# Patient Record
Sex: FEMALE | Race: WHITE | NOT HISPANIC OR LATINO | URBAN - METROPOLITAN AREA
[De-identification: names, ages, dates, MRNs, and addresses within clinical notes are randomized per-mention and may not be internally consistent; named-entity substitution may affect disease eponyms.]

---

## 2019-04-30 ENCOUNTER — APPOINTMENT (RX ONLY)
Dept: URBAN - METROPOLITAN AREA CLINIC 45 | Facility: CLINIC | Age: 84
Setting detail: DERMATOLOGY
End: 2019-04-30

## 2019-04-30 PROBLEM — C44.629 SQUAMOUS CELL CARCINOMA OF SKIN OF LEFT UPPER LIMB, INCLUDING SHOULDER: Status: ACTIVE | Noted: 2019-04-30

## 2019-04-30 PROCEDURE — ? MOHS SURGERY

## 2019-04-30 PROCEDURE — 13131 CMPLX RPR F/C/C/M/N/AX/G/H/F: CPT

## 2019-04-30 PROCEDURE — 17311 MOHS 1 STAGE H/N/HF/G: CPT

## 2019-04-30 NOTE — PROCEDURE: MOHS SURGERY
Purse String (Simple) Text: We discussed various closure modalities with the patient, including healing by second intention, primary closure, skin graft and various flaps, and felt that the location and configuration of the defect indicated that a purse-string closure would result in the least disturbance of the position and function of the surrounding anatomic structures and provide the best result.  The technique, its benefits, alternatives and risks were discussed with the patient.  Appropriate instructions were given, informed consent was obtained, and then the patient underwent the procedure as follows: The patient was taken to the operative suite and placed supine on the operating room table.  The area of the defect and the surrounding skin was anesthetized with buffered 0.5% lidocaine with epinephrine.  The area was washed with chlorhexidine.  Sterile drapes were applied. \\n\\nThe wound edges of the Mohs surgery defect were debeveled. \\n Undermining was performed circumferentially around the surgical defect.  Closure of the subcutaneous was achieved with a deep buried purse string suture, which was then placed and tightened in the subcutaneous tissue.  A superficial layer of horizontal mattress suture was then placed through the epidermis in a purse-string fashion.

## 2019-04-30 NOTE — PROCEDURE: MOHS SURGERY
Rudi Garcia Keystone Flap Text: We discussed various closure modalities with the patient, including healing by second intention, primary closure, skin graft and various flaps.  The location and configuration of the defect indicated that a Keystone flap would result in the least disturbance of the position and function of the surrounding anatomic structures, and provide the best result.  The technique, its benefits, alternatives and risks were discussed with the patient.  The patient underwent the procedure as follows: The patient was positioned supine on the operating room table.  The area of the defect and the surrounding skin were anesthetized with buffered 1% lidocaine with epinephrine.  The area was washed with chlorhexidine.  Sterile drapes were applied. \\n\\nThe defect edges were debeveled with a #15 scalpel blade.  Given the location of the defect, shape of the defect a keystone flap was deemed most appropriate.  Using a sterile surgical marker, an appropriate keystone flap was drawn incorporating the defect, outlining the appropriate donor tissue and placing the expected incisions within the relaxed skin tension lines where possible. The area thus outlined was incised deep to adipose tissue with a #15 scalpel blade.  The skin margins were undermined to an appropriate distance in all directions around the primary defect and laterally outward around the flap utilizing iris scissors.

## 2019-04-30 NOTE — PROCEDURE: MOHS SURGERY
Stage 8: Additional Anesthesia Type: 1% lidocaine with epinephrine and a 1:10 solution of 8.4% sodium bicarbonate

## 2019-04-30 NOTE — PROCEDURE: MOHS SURGERY
Bilobed Flap Text: We discussed various closure modalities with the patient, including healing by second intention, primary closure, skin graft and various flaps.  The location and configuration of the defect indicated that a bilobed transposition flap would result in the least disturbance of the position and function of the surrounding anatomic structures, and provide the best result.  The technique, its benefits, alternatives and risks were discussed with the patient.  The patient underwent the procedure as follows: The patient was positioned supine on the operating room table.  The area of the defect and the surrounding skin were anesthetized with buffered 1% lidocaine with epinephrine.  The area was washed with chlorhexidine.  Sterile drapes were applied. \\n\\nThe wound edges were debeveled and extensively undermined.  A bilobed transposition flap was designed, incised, and elevated.  Hemostasis was achieved with spot electrodesiccation.  The tertiary defect that was created in design of the flap was closed using buried interrupted sutures.  The flap was then transposed to cover the primary defect, trimmed to more accurately fit the defect, and secured in position.  The standing cone so created was removed to fat by triangulation.  The 2nd lobe of the flap was trimmed to fit the secondary defect created in the flap design.  Additional buried interrupted sutures were used to achieve dermal wound apposition and secure the flap in place.  Final cutaneous approximation was achieved with running epidermal sutures.  The closure was manually tested and found to be sound for strength and hemostasis.

## 2019-05-14 ENCOUNTER — APPOINTMENT (RX ONLY)
Dept: URBAN - METROPOLITAN AREA CLINIC 45 | Facility: CLINIC | Age: 84
Setting detail: DERMATOLOGY
End: 2019-05-14

## 2019-05-14 DIAGNOSIS — L90.5 SCAR CONDITIONS AND FIBROSIS OF SKIN: ICD-10-CM

## 2019-05-14 DIAGNOSIS — Z48.817 ENCOUNTER FOR SURGICAL AFTERCARE FOLLOWING SURGERY ON THE SKIN AND SUBCUTANEOUS TISSUE: ICD-10-CM

## 2019-05-14 PROBLEM — J30.1 ALLERGIC RHINITIS DUE TO POLLEN: Status: ACTIVE | Noted: 2019-05-14

## 2019-05-14 PROBLEM — Z85.820 PERSONAL HISTORY OF MALIGNANT MELANOMA OF SKIN: Status: ACTIVE | Noted: 2019-05-14

## 2019-05-14 PROCEDURE — ? POST-OP WOUND CHECK

## 2019-05-14 PROCEDURE — 99024 POSTOP FOLLOW-UP VISIT: CPT

## 2019-05-14 ASSESSMENT — LOCATION SIMPLE DESCRIPTION DERM: LOCATION SIMPLE: LEFT HAND

## 2019-05-14 ASSESSMENT — LOCATION ZONE DERM: LOCATION ZONE: HAND

## 2019-05-14 ASSESSMENT — LOCATION DETAILED DESCRIPTION DERM: LOCATION DETAILED: LEFT RADIAL DORSAL HAND

## 2019-05-14 NOTE — PROCEDURE: POST-OP WOUND CHECK
Body Location Override (Optional - Billing Will Still Be Based On Selected Body Map Location If Applicable): left dorsal hand
Add 85376 Cpt? (Important Note: In 2017 The Use Of 75088 Is Being Tracked By Cms To Determine Future Global Period Reimbursement For Global Periods): yes
Detail Level: Simple

## 2019-10-02 ENCOUNTER — APPOINTMENT (RX ONLY)
Dept: URBAN - METROPOLITAN AREA CLINIC 44 | Facility: CLINIC | Age: 84
Setting detail: DERMATOLOGY
End: 2019-10-02

## 2019-10-02 DIAGNOSIS — D22 MELANOCYTIC NEVI: ICD-10-CM

## 2019-10-02 DIAGNOSIS — L82.1 OTHER SEBORRHEIC KERATOSIS: ICD-10-CM

## 2019-10-02 DIAGNOSIS — Z85.828 PERSONAL HISTORY OF OTHER MALIGNANT NEOPLASM OF SKIN: ICD-10-CM

## 2019-10-02 DIAGNOSIS — Z87.2 PERSONAL HISTORY OF DISEASES OF THE SKIN AND SUBCUTANEOUS TISSUE: ICD-10-CM

## 2019-10-02 DIAGNOSIS — D18.0 HEMANGIOMA: ICD-10-CM

## 2019-10-02 DIAGNOSIS — L81.4 OTHER MELANIN HYPERPIGMENTATION: ICD-10-CM

## 2019-10-02 DIAGNOSIS — L57.0 ACTINIC KERATOSIS: ICD-10-CM

## 2019-10-02 PROBLEM — D48.5 NEOPLASM OF UNCERTAIN BEHAVIOR OF SKIN: Status: ACTIVE | Noted: 2019-10-02

## 2019-10-02 PROBLEM — D18.01 HEMANGIOMA OF SKIN AND SUBCUTANEOUS TISSUE: Status: ACTIVE | Noted: 2019-10-02

## 2019-10-02 PROBLEM — D22.5 MELANOCYTIC NEVI OF TRUNK: Status: ACTIVE | Noted: 2019-10-02

## 2019-10-02 PROCEDURE — ? COUNSELING

## 2019-10-02 PROCEDURE — ? INVENTORY

## 2019-10-02 PROCEDURE — ? SUNSCREEN RECOMMENDATIONS

## 2019-10-02 PROCEDURE — ? FULL BODY SKIN EXAM

## 2019-10-02 PROCEDURE — 99213 OFFICE O/P EST LOW 20 MIN: CPT | Mod: 25

## 2019-10-02 PROCEDURE — ? MEDICARE ABN

## 2019-10-02 PROCEDURE — 17000 DESTRUCT PREMALG LESION: CPT | Mod: 59

## 2019-10-02 PROCEDURE — 11102 TANGNTL BX SKIN SINGLE LES: CPT

## 2019-10-02 PROCEDURE — ? LIQUID NITROGEN

## 2019-10-02 PROCEDURE — ? BIOPSY BY SHAVE METHOD

## 2019-10-02 PROCEDURE — 17003 DESTRUCT PREMALG LES 2-14: CPT

## 2019-10-02 ASSESSMENT — LOCATION DETAILED DESCRIPTION DERM
LOCATION DETAILED: PERIUMBILICAL SKIN
LOCATION DETAILED: LEFT PROXIMAL DORSAL FOREARM
LOCATION DETAILED: RIGHT SUPERIOR UPPER BACK
LOCATION DETAILED: RIGHT POSTERIOR SHOULDER
LOCATION DETAILED: LEFT LATERAL ABDOMEN
LOCATION DETAILED: RIGHT SUPERIOR FOREHEAD
LOCATION DETAILED: LEFT CLAVICULAR SKIN
LOCATION DETAILED: RIGHT RIB CAGE
LOCATION DETAILED: LEFT DISTAL DORSAL FOREARM
LOCATION DETAILED: LEFT RIB CAGE
LOCATION DETAILED: RIGHT LATERAL ABDOMEN
LOCATION DETAILED: NASAL SUPRATIP
LOCATION DETAILED: SUPERIOR THORACIC SPINE

## 2019-10-02 ASSESSMENT — LOCATION SIMPLE DESCRIPTION DERM
LOCATION SIMPLE: LEFT FOREARM
LOCATION SIMPLE: LEFT CLAVICULAR SKIN
LOCATION SIMPLE: UPPER BACK
LOCATION SIMPLE: RIGHT FOREHEAD
LOCATION SIMPLE: RIGHT UPPER BACK
LOCATION SIMPLE: ABDOMEN
LOCATION SIMPLE: RIGHT SHOULDER
LOCATION SIMPLE: NOSE

## 2019-10-02 ASSESSMENT — LOCATION ZONE DERM
LOCATION ZONE: ARM
LOCATION ZONE: TRUNK
LOCATION ZONE: NOSE
LOCATION ZONE: FACE

## 2019-10-02 NOTE — HPI: EVALUATION OF SKIN LESION(S)
What Type Of Note Output Would You Prefer (Optional)?: Bullet Format
Hpi Title: Evaluation of Skin Lesions
How Severe Are Your Spot(S)?: mild
Have Your Spot(S) Been Treated In The Past?: has not been treated
Additional History: Last FSE 04/2019 NICK

## 2019-10-02 NOTE — PROCEDURE: BIOPSY BY SHAVE METHOD
Dressing: bandage
Render Path Notes In Note?: No
Was A Bandage Applied: Yes
Silver Nitrate Text: The wound bed was treated with silver nitrate after the biopsy was performed.
Hemostasis: Aluminum Chloride
Biopsy Method: Personna blade
Lab: 6
Billing Type: Third-Party Bill
Curettage Text: The wound bed was treated with curettage after the biopsy was performed.
Detail Level: Detailed
Post-Care Instructions: I reviewed with the patient in detail post-care instructions. Patient is to keep the biopsy site dry overnight, and then apply aquaphor or vaseline twice daily until healed.
Anesthesia Type: 2% lidocaine with epinephrine and a 1:10 solution of 8.4% sodium bicarbonate
Lab Facility: [id number]
Cryotherapy Text: The wound bed was treated with cryotherapy after the biopsy was performed.
Size Of Lesion In Cm: 0.8
Wound Care: Petrolatum
Additional Anesthesia Volume In Cc (Will Not Render If 0): 0
Notification Instructions: Patient will be notified of biopsy results. However, patient instructed to call the office if not contacted within 2 weeks.
Electrodesiccation Text: The wound bed was treated with electrodesiccation after the biopsy was performed.
Consent: Written consent was obtained and risks were reviewed including but not limited to scarring, infection, bleeding, scabbing, incomplete removal, nerve damage and allergy to anesthesia.
Depth Of Biopsy: dermis
Biopsy Type: H and E
Type Of Destruction Used: Curettage
Electrodesiccation And Curettage Text: The wound bed was treated with electrodesiccation and curettage after the biopsy was performed.
Anesthesia Volume In Cc (Will Not Render If 0): 1

## 2019-10-02 NOTE — PROCEDURE: MEDICARE ABN
Procedure (Limit To 20 Characters): AK
Reason?: Additional Information
Detail Level: Detailed
Payment Option: Option 1: Bill Medicare, await for decision on payment.
Reason?: Precancerous
Modifier Description: The following is a description of the modifiers listed below. GA - is added to claims with a properly executed Advanced Beneficiary Notice (ABN) in the file. GY - is added to claims in which the item or service is statutorily excluded, does not meet the definition of any Medicare benefit, or -for non-Medicare Insurers- is not a contract benefit. GZ - - Item or service expected to be denied as not reasonable and necessary.
Modifier Description: The following is a description of the modifiers listed below. GA - is added to claims with a properly executed Advanced Beneficiary Notice (ABN) in the file. GY - is added to claims in which the item or service is statutorily excluded, does not meet the definition of any Medicare benefit, or -for non-Medicare Insurers- is not a contract benefit. GZ - - Item or service expected to be denied as not reasonable and necessary.
Reason?: non-covered service
Procedure (Limit To 20 Characters): biopsy

## 2020-10-26 ENCOUNTER — APPOINTMENT (RX ONLY)
Dept: URBAN - METROPOLITAN AREA CLINIC 44 | Facility: CLINIC | Age: 85
Setting detail: DERMATOLOGY
End: 2020-10-26

## 2020-10-26 DIAGNOSIS — L81.4 OTHER MELANIN HYPERPIGMENTATION: ICD-10-CM

## 2020-10-26 DIAGNOSIS — Z87.2 PERSONAL HISTORY OF DISEASES OF THE SKIN AND SUBCUTANEOUS TISSUE: ICD-10-CM

## 2020-10-26 DIAGNOSIS — L57.0 ACTINIC KERATOSIS: ICD-10-CM

## 2020-10-26 DIAGNOSIS — Z85.828 PERSONAL HISTORY OF OTHER MALIGNANT NEOPLASM OF SKIN: ICD-10-CM

## 2020-10-26 DIAGNOSIS — L82.1 OTHER SEBORRHEIC KERATOSIS: ICD-10-CM

## 2020-10-26 DIAGNOSIS — B07.8 OTHER VIRAL WARTS: ICD-10-CM

## 2020-10-26 DIAGNOSIS — D22 MELANOCYTIC NEVI: ICD-10-CM

## 2020-10-26 DIAGNOSIS — D18.0 HEMANGIOMA: ICD-10-CM

## 2020-10-26 PROBLEM — D18.01 HEMANGIOMA OF SKIN AND SUBCUTANEOUS TISSUE: Status: ACTIVE | Noted: 2020-10-26

## 2020-10-26 PROBLEM — D22.5 MELANOCYTIC NEVI OF TRUNK: Status: ACTIVE | Noted: 2020-10-26

## 2020-10-26 PROCEDURE — 99214 OFFICE O/P EST MOD 30 MIN: CPT | Mod: 25

## 2020-10-26 PROCEDURE — ? LIQUID NITROGEN

## 2020-10-26 PROCEDURE — 17110 DESTRUCTION B9 LES UP TO 14: CPT

## 2020-10-26 PROCEDURE — ? COUNSELING

## 2020-10-26 PROCEDURE — 17003 DESTRUCT PREMALG LES 2-14: CPT | Mod: 59

## 2020-10-26 PROCEDURE — ? SUNSCREEN RECOMMENDATIONS

## 2020-10-26 PROCEDURE — ? FULL BODY SKIN EXAM

## 2020-10-26 PROCEDURE — 17000 DESTRUCT PREMALG LESION: CPT | Mod: 59

## 2020-10-26 PROCEDURE — ? MEDICARE ABN

## 2020-10-26 ASSESSMENT — LOCATION DETAILED DESCRIPTION DERM
LOCATION DETAILED: PERIUMBILICAL SKIN
LOCATION DETAILED: LEFT RIB CAGE
LOCATION DETAILED: 3RD WEB SPACE RIGHT HAND
LOCATION DETAILED: RIGHT RIB CAGE
LOCATION DETAILED: RIGHT SUPERIOR FOREHEAD
LOCATION DETAILED: RIGHT LATERAL ABDOMEN
LOCATION DETAILED: LEFT LATERAL ABDOMEN
LOCATION DETAILED: LEFT LATERAL FOREHEAD
LOCATION DETAILED: RIGHT RADIAL DORSAL HAND

## 2020-10-26 ASSESSMENT — LOCATION ZONE DERM
LOCATION ZONE: FACE
LOCATION ZONE: HAND
LOCATION ZONE: TRUNK

## 2020-10-26 ASSESSMENT — LOCATION SIMPLE DESCRIPTION DERM
LOCATION SIMPLE: RIGHT FOREHEAD
LOCATION SIMPLE: RIGHT HAND
LOCATION SIMPLE: ABDOMEN
LOCATION SIMPLE: LEFT FOREHEAD

## 2020-10-26 NOTE — PROCEDURE: MEDICARE ABN
Reason?: Additional Information
Payment Option: Option 2: Don't Bill Medicare, patient responsible for payment. Patient cannot appeal Medicare if billed.
Procedure (Limit To 20 Characters): brace
Detail Level: Detailed
Reason?: non-covered service

## 2020-10-26 NOTE — PROCEDURE: LIQUID NITROGEN
Render Note In Bullet Format When Appropriate: No
Number Of Freeze-Thaw Cycles: 2 freeze-thaw cycles
Detail Level: Zone
Duration Of Freeze Thaw-Cycle (Seconds): 0
Consent: The patient's consent was obtained including but not limited to risks of crusting, scabbing, blistering, scarring, darker or lighter pigmentary change, recurrence, incomplete removal and infection.
Post-Care Instructions: I reviewed with the patient in detail post-care instructions. Patient is to wear sunprotection, and avoid picking at any of the treated lesions. Pt may apply Vaseline to crusted or scabbing areas.
Medical Necessity Information: It is in your best interest to select a reason for this procedure from the list below. All of these items fulfill various CMS LCD requirements except the new and changing color options.
Medical Necessity Clause: This procedure was medically necessary because the lesions that were treated were: irritating to patient

## 2020-10-26 NOTE — HPI: EVALUATION OF SKIN LESION(S)
What Type Of Note Output Would You Prefer (Optional)?: Bullet Format
Hpi Title: Evaluation of Skin Lesions
How Severe Are Your Spot(S)?: mild
Have Your Spot(S) Been Treated In The Past?: has not been treated
Additional History: FSE with Jdl 10/2019

## 2021-04-28 ENCOUNTER — APPOINTMENT (RX ONLY)
Dept: URBAN - METROPOLITAN AREA CLINIC 44 | Facility: CLINIC | Age: 86
Setting detail: DERMATOLOGY
End: 2021-04-28

## 2021-04-28 DIAGNOSIS — D22 MELANOCYTIC NEVI: ICD-10-CM

## 2021-04-28 DIAGNOSIS — B07.8 OTHER VIRAL WARTS: ICD-10-CM

## 2021-04-28 DIAGNOSIS — L82.1 OTHER SEBORRHEIC KERATOSIS: ICD-10-CM

## 2021-04-28 DIAGNOSIS — R23.8 OTHER SKIN CHANGES: ICD-10-CM

## 2021-04-28 DIAGNOSIS — Z85.828 PERSONAL HISTORY OF OTHER MALIGNANT NEOPLASM OF SKIN: ICD-10-CM

## 2021-04-28 DIAGNOSIS — Z85.820 PERSONAL HISTORY OF MALIGNANT MELANOMA OF SKIN: ICD-10-CM

## 2021-04-28 DIAGNOSIS — L57.0 ACTINIC KERATOSIS: ICD-10-CM

## 2021-04-28 DIAGNOSIS — L81.4 OTHER MELANIN HYPERPIGMENTATION: ICD-10-CM

## 2021-04-28 DIAGNOSIS — D18.0 HEMANGIOMA: ICD-10-CM

## 2021-04-28 PROBLEM — D48.5 NEOPLASM OF UNCERTAIN BEHAVIOR OF SKIN: Status: ACTIVE | Noted: 2021-04-28

## 2021-04-28 PROBLEM — D22.5 MELANOCYTIC NEVI OF TRUNK: Status: ACTIVE | Noted: 2021-04-28

## 2021-04-28 PROBLEM — D18.01 HEMANGIOMA OF SKIN AND SUBCUTANEOUS TISSUE: Status: ACTIVE | Noted: 2021-04-28

## 2021-04-28 PROCEDURE — ? LIQUID NITROGEN

## 2021-04-28 PROCEDURE — 17003 DESTRUCT PREMALG LES 2-14: CPT | Mod: 59

## 2021-04-28 PROCEDURE — ? COUNSELING

## 2021-04-28 PROCEDURE — 17000 DESTRUCT PREMALG LESION: CPT | Mod: 59

## 2021-04-28 PROCEDURE — ? SUNSCREEN RECOMMENDATIONS

## 2021-04-28 PROCEDURE — 99213 OFFICE O/P EST LOW 20 MIN: CPT | Mod: 25

## 2021-04-28 PROCEDURE — 17110 DESTRUCTION B9 LES UP TO 14: CPT

## 2021-04-28 PROCEDURE — ? BIOPSY BY SHAVE METHOD

## 2021-04-28 PROCEDURE — ? FULL BODY SKIN EXAM

## 2021-04-28 PROCEDURE — ? MEDICARE ABN

## 2021-04-28 PROCEDURE — 11102 TANGNTL BX SKIN SINGLE LES: CPT | Mod: 59

## 2021-04-28 PROCEDURE — ? ADDITIONAL NOTES

## 2021-04-28 ASSESSMENT — LOCATION SIMPLE DESCRIPTION DERM
LOCATION SIMPLE: LEFT UPPER ARM
LOCATION SIMPLE: ABDOMEN
LOCATION SIMPLE: LEFT UPPER BACK
LOCATION SIMPLE: RIGHT MIDDLE FINGER
LOCATION SIMPLE: RIGHT PRETIBIAL REGION
LOCATION SIMPLE: LEFT LIP
LOCATION SIMPLE: LEFT PRETIBIAL REGION
LOCATION SIMPLE: LEFT ANTERIOR NECK
LOCATION SIMPLE: RIGHT SMALL FINGER
LOCATION SIMPLE: LEFT MIDDLE FINGER
LOCATION SIMPLE: POSTERIOR NECK
LOCATION SIMPLE: RIGHT HAND

## 2021-04-28 ASSESSMENT — LOCATION DETAILED DESCRIPTION DERM
LOCATION DETAILED: RIGHT PROXIMAL RADIAL PALMAR SMALL FINGER
LOCATION DETAILED: LEFT MEDIAL TRAPEZIAL NECK
LOCATION DETAILED: RIGHT DISTAL RADIAL PALMAR MIDDLE FINGER
LOCATION DETAILED: LEFT SUPERIOR LATERAL NECK
LOCATION DETAILED: RIGHT PROXIMAL PALMAR SMALL FINGER
LOCATION DETAILED: LEFT PROXIMAL PRETIBIAL REGION
LOCATION DETAILED: RIGHT ULNAR PALM
LOCATION DETAILED: LEFT MIDDLE PROXIMAL INTERPHALANGEAL JOINT
LOCATION DETAILED: RIGHT DISTAL PRETIBIAL REGION
LOCATION DETAILED: LEFT MID-UPPER BACK
LOCATION DETAILED: LEFT ANTERIOR PROXIMAL UPPER ARM
LOCATION DETAILED: RIGHT RIB CAGE
LOCATION DETAILED: RIGHT LATERAL TRAPEZIAL NECK
LOCATION DETAILED: RIGHT LATERAL ABDOMEN
LOCATION DETAILED: RIGHT PROXIMAL PRETIBIAL REGION
LOCATION DETAILED: LEFT LATERAL ABDOMEN
LOCATION DETAILED: LEFT DISTAL PRETIBIAL REGION
LOCATION DETAILED: LEFT INFERIOR VERMILION LIP

## 2021-04-28 ASSESSMENT — LOCATION ZONE DERM
LOCATION ZONE: FINGER
LOCATION ZONE: LEG
LOCATION ZONE: HAND
LOCATION ZONE: NECK
LOCATION ZONE: LIP
LOCATION ZONE: TRUNK
LOCATION ZONE: ARM

## 2021-04-28 NOTE — PROCEDURE: ADDITIONAL NOTES
Additional Notes: Patient consent was obtained to proceed with the visit and recommended plan of care after discussion of all risks and benefits, including the risks of COVID-19 exposure.
Detail Level: Simple
Additional Notes: Patient inquired about fillers for the lips and around the mouth. Quoted $700/syringe restylane L/emily mentioned. Written quote given
Render Risk Assessment In Note?: no

## 2021-04-28 NOTE — HPI: EVALUATION OF SKIN LESION(S)
What Type Of Note Output Would You Prefer (Optional)?: Bullet Format
Hpi Title: Evaluation of Skin Lesions
How Severe Are Your Spot(S)?: mild
Have Your Spot(S) Been Treated In The Past?: has not been treated
Additional History: Last seen October 2020 Dr. Nascimento \\nPatient reports several new concerns

## 2021-04-28 NOTE — PROCEDURE: BIOPSY BY SHAVE METHOD
Detail Level: Detailed
Depth Of Biopsy: dermis
Was A Bandage Applied: Yes
Size Of Lesion In Cm: 1
X Size Of Lesion In Cm: 0
Biopsy Type: H and E
Biopsy Method: Personna blade
Anesthesia Type: 2% lidocaine with epinephrine and a 1:10 solution of 8.4% sodium bicarbonate
Hemostasis: Aluminum Chloride
Wound Care: Petrolatum
Dressing: bandage
Destruction After The Procedure: No
Type Of Destruction Used: Curettage
Curettage Text: The wound bed was treated with curettage after the biopsy was performed.
Cryotherapy Text: The wound bed was treated with cryotherapy after the biopsy was performed.
Electrodesiccation Text: The wound bed was treated with electrodesiccation after the biopsy was performed.
Electrodesiccation And Curettage Text: The wound bed was treated with electrodesiccation and curettage after the biopsy was performed.
Silver Nitrate Text: The wound bed was treated with silver nitrate after the biopsy was performed.
Lab: 6
Lab Facility: [id number]
Consent: Written consent was obtained and risks were reviewed including but not limited to scarring, infection, bleeding, scabbing, incomplete removal, nerve damage and allergy to anesthesia.
Post-Care Instructions: I reviewed with the patient in detail post-care instructions. Patient is to keep the biopsy site dry overnight, and then apply aquaphor or vaseline twice daily until healed.
Notification Instructions: Patient will be notified of biopsy results. However, patient instructed to call the office if not contacted within 2 weeks.
Billing Type: Third-Party Bill
Information: Selecting Yes will display possible errors in your note based on the variables you have selected. This validation is only offered as a suggestion for you. PLEASE NOTE THAT THE VALIDATION TEXT WILL BE REMOVED WHEN YOU FINALIZE YOUR NOTE. IF YOU WANT TO FAX A PRELIMINARY NOTE YOU WILL NEED TO TOGGLE THIS TO 'NO' IF YOU DO NOT WANT IT IN YOUR FAXED NOTE.

## 2021-04-28 NOTE — PROCEDURE: MEDICARE ABN
Detail Level: Detailed
Reason?: non-covered service
Payment Option: Option 1: Bill Medicare, await for decision on payment.
Reason?: Additional Information precancerous lesions
Procedure (Limit To 20 Characters): actinic keratosis (LN2)

## 2021-05-12 ENCOUNTER — APPOINTMENT (RX ONLY)
Dept: URBAN - METROPOLITAN AREA CLINIC 44 | Facility: CLINIC | Age: 86
Setting detail: DERMATOLOGY
End: 2021-05-12

## 2021-05-12 DIAGNOSIS — L57.0 ACTINIC KERATOSIS: ICD-10-CM

## 2021-05-12 PROCEDURE — ? COUNSELING

## 2021-05-12 PROCEDURE — ? LIQUID NITROGEN

## 2021-05-12 PROCEDURE — 17000 DESTRUCT PREMALG LESION: CPT

## 2021-05-12 ASSESSMENT — LOCATION ZONE DERM: LOCATION ZONE: LEG

## 2021-05-12 ASSESSMENT — LOCATION SIMPLE DESCRIPTION DERM: LOCATION SIMPLE: LEFT PRETIBIAL REGION

## 2021-05-12 ASSESSMENT — LOCATION DETAILED DESCRIPTION DERM: LOCATION DETAILED: LEFT DISTAL PRETIBIAL REGION

## 2021-08-31 ENCOUNTER — APPOINTMENT (RX ONLY)
Dept: URBAN - METROPOLITAN AREA CLINIC 45 | Facility: CLINIC | Age: 86
Setting detail: DERMATOLOGY
End: 2021-08-31

## 2021-08-31 DIAGNOSIS — L98.8 OTHER SPECIFIED DISORDERS OF THE SKIN AND SUBCUTANEOUS TISSUE: ICD-10-CM | Status: INADEQUATELY CONTROLLED

## 2021-08-31 PROCEDURE — ? FILLERS

## 2021-08-31 ASSESSMENT — LOCATION DETAILED DESCRIPTION DERM
LOCATION DETAILED: RIGHT UPPER CUTANEOUS LIP
LOCATION DETAILED: LEFT UPPER CUTANEOUS LIP
LOCATION DETAILED: RIGHT INFERIOR VERMILION LIP

## 2021-08-31 ASSESSMENT — LOCATION ZONE DERM: LOCATION ZONE: LIP

## 2021-08-31 ASSESSMENT — LOCATION SIMPLE DESCRIPTION DERM
LOCATION SIMPLE: RIGHT LIP
LOCATION SIMPLE: LEFT LIP

## 2021-08-31 NOTE — PROCEDURE: FILLERS
Price (Use Numbers Only, No Special Characters Or $): 382 Price (Use Numbers Only, No Special Characters Or $): 247

## 2021-11-24 ENCOUNTER — APPOINTMENT (RX ONLY)
Dept: URBAN - METROPOLITAN AREA CLINIC 44 | Facility: CLINIC | Age: 86
Setting detail: DERMATOLOGY
End: 2021-11-24

## 2021-11-24 DIAGNOSIS — D22 MELANOCYTIC NEVI: ICD-10-CM

## 2021-11-24 DIAGNOSIS — L82.1 OTHER SEBORRHEIC KERATOSIS: ICD-10-CM

## 2021-11-24 DIAGNOSIS — D18.0 HEMANGIOMA: ICD-10-CM

## 2021-11-24 DIAGNOSIS — L81.4 OTHER MELANIN HYPERPIGMENTATION: ICD-10-CM

## 2021-11-24 DIAGNOSIS — L57.8 OTHER SKIN CHANGES DUE TO CHRONIC EXPOSURE TO NONIONIZING RADIATION: ICD-10-CM

## 2021-11-24 DIAGNOSIS — Z87.2 PERSONAL HISTORY OF DISEASES OF THE SKIN AND SUBCUTANEOUS TISSUE: ICD-10-CM

## 2021-11-24 DIAGNOSIS — L57.0 ACTINIC KERATOSIS: ICD-10-CM | Status: INADEQUATELY CONTROLLED

## 2021-11-24 DIAGNOSIS — Z85.828 PERSONAL HISTORY OF OTHER MALIGNANT NEOPLASM OF SKIN: ICD-10-CM

## 2021-11-24 PROBLEM — D22.5 MELANOCYTIC NEVI OF TRUNK: Status: ACTIVE | Noted: 2021-11-24

## 2021-11-24 PROBLEM — D18.01 HEMANGIOMA OF SKIN AND SUBCUTANEOUS TISSUE: Status: ACTIVE | Noted: 2021-11-24

## 2021-11-24 PROBLEM — D48.5 NEOPLASM OF UNCERTAIN BEHAVIOR OF SKIN: Status: ACTIVE | Noted: 2021-11-24

## 2021-11-24 PROCEDURE — ? TREATMENT REGIMEN

## 2021-11-24 PROCEDURE — 17003 DESTRUCT PREMALG LES 2-14: CPT

## 2021-11-24 PROCEDURE — 17000 DESTRUCT PREMALG LESION: CPT | Mod: 59

## 2021-11-24 PROCEDURE — ? LIQUID NITROGEN

## 2021-11-24 PROCEDURE — ? BIOPSY BY SHAVE METHOD

## 2021-11-24 PROCEDURE — 99213 OFFICE O/P EST LOW 20 MIN: CPT | Mod: 25

## 2021-11-24 PROCEDURE — ? COUNSELING

## 2021-11-24 PROCEDURE — ? ADDITIONAL NOTES

## 2021-11-24 PROCEDURE — ? SUNSCREEN RECOMMENDATIONS

## 2021-11-24 PROCEDURE — ? FULL BODY SKIN EXAM

## 2021-11-24 PROCEDURE — 11102 TANGNTL BX SKIN SINGLE LES: CPT

## 2021-11-24 ASSESSMENT — LOCATION SIMPLE DESCRIPTION DERM
LOCATION SIMPLE: LEFT FOREARM
LOCATION SIMPLE: LEFT TEMPLE
LOCATION SIMPLE: RIGHT ANTERIOR NECK
LOCATION SIMPLE: RIGHT CHEEK
LOCATION SIMPLE: CHIN
LOCATION SIMPLE: ABDOMEN
LOCATION SIMPLE: RIGHT ANTERIOR NECK
LOCATION SIMPLE: LEFT CHEEK
LOCATION SIMPLE: RIGHT UPPER ARM
LOCATION SIMPLE: LEFT ANTERIOR NECK
LOCATION SIMPLE: LEFT POSTERIOR UPPER ARM
LOCATION SIMPLE: LEFT HAND

## 2021-11-24 ASSESSMENT — LOCATION ZONE DERM
LOCATION ZONE: FACE
LOCATION ZONE: TRUNK
LOCATION ZONE: ARM
LOCATION ZONE: FACE
LOCATION ZONE: NECK
LOCATION ZONE: HAND
LOCATION ZONE: NECK

## 2021-11-24 ASSESSMENT — LOCATION DETAILED DESCRIPTION DERM
LOCATION DETAILED: RIGHT INFERIOR CENTRAL MALAR CHEEK
LOCATION DETAILED: LEFT INFERIOR MEDIAL MALAR CHEEK
LOCATION DETAILED: LEFT INFERIOR TEMPLE
LOCATION DETAILED: RIGHT ANTERIOR PROXIMAL UPPER ARM
LOCATION DETAILED: LEFT CHIN
LOCATION DETAILED: LEFT PROXIMAL DORSAL FOREARM
LOCATION DETAILED: LEFT LATERAL ABDOMEN
LOCATION DETAILED: LEFT INFERIOR LATERAL NECK
LOCATION DETAILED: RIGHT SUPERIOR ANTERIOR NECK
LOCATION DETAILED: EPIGASTRIC SKIN
LOCATION DETAILED: LEFT PROXIMAL POSTERIOR UPPER ARM
LOCATION DETAILED: LEFT INFERIOR ANTERIOR NECK
LOCATION DETAILED: RIGHT INFERIOR ANTERIOR NECK
LOCATION DETAILED: RIGHT RIB CAGE
LOCATION DETAILED: LEFT ULNAR DORSAL HAND

## 2021-11-24 NOTE — PROCEDURE: LIQUID NITROGEN
Post-Care Instructions: I reviewed with the patient in detail post-care instructions. Patient is to wear sunprotection, and avoid picking at any of the treated lesions. Pt may apply Vaseline to crusted or scabbing areas.
Duration Of Freeze Thaw-Cycle (Seconds): 0
Render Post-Care Instructions In Note?: no
Detail Level: Zone
Consent: The patient's consent was obtained including but not limited to risks of crusting, scabbing, blistering, scarring, darker or lighter pigmentary change, recurrence, incomplete removal and infection.
Show Aperture Variable?: Yes

## 2021-11-24 NOTE — HPI: EVALUATION OF SKIN LESION(S)
What Type Of Note Output Would You Prefer (Optional)?: Bullet Format
Hpi Title: Evaluation of Skin Lesions
How Severe Are Your Spot(S)?: mild
Have Your Spot(S) Been Treated In The Past?: has not been treated
Additional History: EST JDL 4/2021.check spots on neck and lip.

## 2021-12-09 ENCOUNTER — APPOINTMENT (RX ONLY)
Dept: URBAN - METROPOLITAN AREA CLINIC 45 | Facility: CLINIC | Age: 86
Setting detail: DERMATOLOGY
End: 2021-12-09

## 2021-12-09 DIAGNOSIS — Z02.9 ENCOUNTER FOR ADMINISTRATIVE EXAMINATIONS, UNSPECIFIED: ICD-10-CM

## 2021-12-14 ENCOUNTER — APPOINTMENT (RX ONLY)
Dept: URBAN - METROPOLITAN AREA CLINIC 45 | Facility: CLINIC | Age: 86
Setting detail: DERMATOLOGY
End: 2021-12-14

## 2021-12-14 PROBLEM — C44.01 BASAL CELL CARCINOMA OF SKIN OF LIP: Status: ACTIVE | Noted: 2021-12-14

## 2021-12-14 PROCEDURE — 17311 MOHS 1 STAGE H/N/HF/G: CPT

## 2021-12-14 PROCEDURE — ? PRESCRIPTION

## 2021-12-14 PROCEDURE — 17312 MOHS ADDL STAGE: CPT

## 2021-12-14 PROCEDURE — ? MOHS SURGERY

## 2021-12-14 PROCEDURE — 13151 CMPLX RPR E/N/E/L 1.1-2.5 CM: CPT

## 2021-12-14 RX ORDER — VALACYCLOVIR HYDROCHLORIDE 1 G/1
1 TABLET, FILM COATED ORAL BID
Qty: 2 | Refills: 0 | Status: ERX | COMMUNITY
Start: 2021-12-14

## 2021-12-14 RX ADMIN — VALACYCLOVIR HYDROCHLORIDE 1: 1 TABLET, FILM COATED ORAL at 00:00

## 2021-12-14 NOTE — PROCEDURE: MOHS SURGERY
Type 2 diabetes mellitus with chronic kidney disease, with long-term current use of insulin, unspecified CKD stage Dyspnea on exertion Split-Thickness Skin Graft Text: The defect edges were debeveled with a #15 scalpel blade.  Given the location of the defect, shape of the defect and the proximity to free margins a split thickness skin graft was deemed most appropriate.  Using a sterile surgical marker, the primary defect shape was transferred to the donor site. The split thickness graft was then harvested.  The skin graft was then placed in the primary defect and oriented appropriately.

## 2021-12-14 NOTE — PROCEDURE: MOHS SURGERY
Principal Discharge DX:	Hemorrhagic cyst of right ovary   Complex Repair And Flap Additional Text (Will Appearing After The Standard Complex Repair Text): The complex repair was not sufficient to completely close the primary defect. The remaining additional defect was repaired with the flap mentioned below.

## 2022-02-02 ENCOUNTER — APPOINTMENT (RX ONLY)
Dept: URBAN - METROPOLITAN AREA CLINIC 44 | Facility: CLINIC | Age: 87
Setting detail: DERMATOLOGY
End: 2022-02-02

## 2022-02-02 DIAGNOSIS — L24 IRRITANT CONTACT DERMATITIS: ICD-10-CM | Status: INADEQUATELY CONTROLLED

## 2022-02-02 DIAGNOSIS — L82.0 INFLAMED SEBORRHEIC KERATOSIS: ICD-10-CM

## 2022-02-02 DIAGNOSIS — D17 BENIGN LIPOMATOUS NEOPLASM: ICD-10-CM

## 2022-02-02 PROBLEM — L24.9 IRRITANT CONTACT DERMATITIS, UNSPECIFIED CAUSE: Status: ACTIVE | Noted: 2022-02-02

## 2022-02-02 PROBLEM — D48.5 NEOPLASM OF UNCERTAIN BEHAVIOR OF SKIN: Status: ACTIVE | Noted: 2022-02-02

## 2022-02-02 PROCEDURE — ? FULL BODY SKIN EXAM - DECLINED

## 2022-02-02 PROCEDURE — ? LIQUID NITROGEN

## 2022-02-02 PROCEDURE — ? MEDICARE ABN

## 2022-02-02 PROCEDURE — 17110 DESTRUCTION B9 LES UP TO 14: CPT

## 2022-02-02 PROCEDURE — ? COUNSELING

## 2022-02-02 PROCEDURE — ? DEFER

## 2022-02-02 PROCEDURE — 99214 OFFICE O/P EST MOD 30 MIN: CPT | Mod: 25

## 2022-02-02 PROCEDURE — ? PRESCRIPTION

## 2022-02-02 PROCEDURE — ? ADDITIONAL NOTES

## 2022-02-02 RX ORDER — TRIAMCINOLONE ACETONIDE 1 MG/G
CREAM TOPICAL BID
Qty: 45 | Refills: 1 | Status: ERX | COMMUNITY
Start: 2022-02-02

## 2022-02-02 RX ADMIN — TRIAMCINOLONE ACETONIDE: 1 CREAM TOPICAL at 00:00

## 2022-02-02 ASSESSMENT — LOCATION ZONE DERM
LOCATION ZONE: FINGER
LOCATION ZONE: ARM
LOCATION ZONE: NECK

## 2022-02-02 ASSESSMENT — LOCATION DETAILED DESCRIPTION DERM
LOCATION DETAILED: LEFT PROXIMAL DORSAL MIDDLE FINGER
LOCATION DETAILED: RIGHT PROXIMAL DORSAL MIDDLE FINGER
LOCATION DETAILED: LEFT INFERIOR LATERAL NECK
LOCATION DETAILED: RIGHT ELBOW
LOCATION DETAILED: LEFT CENTRAL LATERAL NECK

## 2022-02-02 ASSESSMENT — LOCATION SIMPLE DESCRIPTION DERM
LOCATION SIMPLE: LEFT ANTERIOR NECK
LOCATION SIMPLE: LEFT MIDDLE FINGER
LOCATION SIMPLE: RIGHT ELBOW
LOCATION SIMPLE: NECK
LOCATION SIMPLE: RIGHT MIDDLE FINGER

## 2022-02-02 NOTE — PROCEDURE: LIQUID NITROGEN
Medical Necessity Clause: This procedure was medically necessary because the lesions that were treated were:
Show Spray Paint Technique Variable?: Yes
Spray Paint Technique: No
Medical Necessity Information: It is in your best interest to select a reason for this procedure from the list below. All of these items fulfill various CMS LCD requirements except the new and changing color options.
Post-Care Instructions: I reviewed with the patient in detail post-care instructions. Patient is to wear sunprotection, and avoid picking at any of the treated lesions. Pt may apply Vaseline to crusted or scabbing areas.
Detail Level: Zone
Spray Paint Text: The liquid nitrogen was applied to the skin utilizing a spray paint frosting technique.
Consent: The patient's consent was obtained including but not limited to risks of crusting, scabbing, blistering, scarring, darker or lighter pigmentary change, recurrence, incomplete removal and infection.

## 2022-02-02 NOTE — PROCEDURE: DEFER
Detail Level: Detailed
Introduction Text (Please End With A Colon): The following procedure was deferred:
Reason To Defer Override: PCP for referral to a specialist for further evaluation

## 2022-02-09 ENCOUNTER — APPOINTMENT (RX ONLY)
Dept: URBAN - METROPOLITAN AREA CLINIC 44 | Facility: CLINIC | Age: 87
Setting detail: DERMATOLOGY
End: 2022-02-09

## 2022-02-09 DIAGNOSIS — L98.8 OTHER SPECIFIED DISORDERS OF THE SKIN AND SUBCUTANEOUS TISSUE: ICD-10-CM

## 2022-02-09 PROCEDURE — ? ADDITIONAL NOTES

## 2022-02-09 PROCEDURE — ? RESTYLANE-L INJECTION

## 2022-02-09 ASSESSMENT — LOCATION DETAILED DESCRIPTION DERM
LOCATION DETAILED: RIGHT SUPERIOR VERMILION LIP
LOCATION DETAILED: LEFT INFERIOR VERMILION LIP

## 2022-02-09 ASSESSMENT — LOCATION SIMPLE DESCRIPTION DERM
LOCATION SIMPLE: RIGHT LIP
LOCATION SIMPLE: LEFT LIP

## 2022-02-09 ASSESSMENT — LOCATION ZONE DERM: LOCATION ZONE: LIP

## 2022-02-09 NOTE — PROCEDURE: ADDITIONAL NOTES
Render Risk Assessment In Note?: no
Detail Level: Simple
Additional Notes: Discussed Viva Page laser with Geetha,see cosmetic sheet.Pt received the [employer] special $75 off syringe.

## 2022-02-09 NOTE — PROCEDURE: RESTYLANE-L INJECTION
Price (Use Numbers Only, No Special Characters Or $): 239 Price (Use Numbers Only, No Special Characters Or $): 061

## 2022-04-04 NOTE — PROCEDURE: MOHS SURGERY
Bedside spirometry done with good effort W Plasty Text: The lesion was extirpated to the level of the fat with a #15 scalpel blade.  Given the location of the defect, shape of the defect and the proximity to free margins a W-plasty was deemed most appropriate for repair.  Using a sterile surgical marker, the appropriate transposition arms of the W-plasty were drawn incorporating the defect and placing the expected incisions within the relaxed skin tension lines where possible.    The area thus outlined was incised deep to adipose tissue with a #15 scalpel blade.  The skin margins were undermined to an appropriate distance in all directions utilizing iris scissors.  The opposing transposition arms were then transposed into place in opposite direction and anchored with interrupted buried subcutaneous sutures.

## 2022-04-26 NOTE — PROCEDURE: MOHS SURGERY
O-T Advancement Flap Text: We discussed various closure modalities with the patient, including healing by second intention, primary closure, skin graft and various flaps.  The location and configuration of the defect indicated that an O-T advancement flap would result in the least disturbance of the position and function of the surrounding anatomic structures, and provide the best result.  The technique, its benefits, alternatives and risks were discussed with the patient.  The patient underwent the procedure as follows: The patient was positioned supine on the operating room table.  The area of the defect and the surrounding skin were anesthetized with buffered 1% lidocaine with epinephrine.  The area was washed with chlorhexidine.  Sterile drapes were applied. \\n\\nThe wound edges were debeveled and extensively undermined.  A O-T advancement flap was designed, incised, and elevated.  Hemostasis was achieved with spot electrodesiccation.  The flap was advanced into position to cover the primary defect and a key suture was used to secure the flap into place.  Additional buried interrupted sutures were used to close the arms of the flap by the rule of halves to share out the unequal lengths.  The standing cones so created by the design of the flap was removed to fat by triangulation.  Final cutaneous approximation was achieved.  The closure was manually tested and found to be sound for strength and hemostasis. Alar Island Pedicle Flap Text: The defect edges were debeveled with a #15 scalpel blade.  Given the location of the defect, shape of the defect and the proximity to the alar rim an island pedicle advancement flap was deemed most appropriate.  Using a sterile surgical marker, an appropriate advancement flap was drawn incorporating the defect, outlining the appropriate donor tissue and placing the expected incisions within the nasal ala running parallel to the alar rim. The area thus outlined was incised with a #15 scalpel blade.  The skin margins were undermined minimally to an appropriate distance in all directions around the primary defect and laterally outward around the island pedicle utilizing iris scissors.  There was minimal undermining beneath the pedicle flap.

## 2022-06-01 ENCOUNTER — APPOINTMENT (RX ONLY)
Dept: URBAN - METROPOLITAN AREA CLINIC 44 | Facility: CLINIC | Age: 87
Setting detail: DERMATOLOGY
End: 2022-06-01

## 2022-06-01 DIAGNOSIS — L40.8 OTHER PSORIASIS: ICD-10-CM

## 2022-06-01 DIAGNOSIS — L81.4 OTHER MELANIN HYPERPIGMENTATION: ICD-10-CM

## 2022-06-01 DIAGNOSIS — L82.1 OTHER SEBORRHEIC KERATOSIS: ICD-10-CM

## 2022-06-01 DIAGNOSIS — D22 MELANOCYTIC NEVI: ICD-10-CM

## 2022-06-01 DIAGNOSIS — L57.0 ACTINIC KERATOSIS: ICD-10-CM

## 2022-06-01 DIAGNOSIS — L72.8 OTHER FOLLICULAR CYSTS OF THE SKIN AND SUBCUTANEOUS TISSUE: ICD-10-CM

## 2022-06-01 DIAGNOSIS — Z85.828 PERSONAL HISTORY OF OTHER MALIGNANT NEOPLASM OF SKIN: ICD-10-CM

## 2022-06-01 DIAGNOSIS — D18.0 HEMANGIOMA: ICD-10-CM

## 2022-06-01 DIAGNOSIS — L82.0 INFLAMED SEBORRHEIC KERATOSIS: ICD-10-CM

## 2022-06-01 DIAGNOSIS — L57.8 OTHER SKIN CHANGES DUE TO CHRONIC EXPOSURE TO NONIONIZING RADIATION: ICD-10-CM

## 2022-06-01 PROBLEM — D22.5 MELANOCYTIC NEVI OF TRUNK: Status: ACTIVE | Noted: 2022-06-01

## 2022-06-01 PROBLEM — D18.01 HEMANGIOMA OF SKIN AND SUBCUTANEOUS TISSUE: Status: ACTIVE | Noted: 2022-06-01

## 2022-06-01 PROCEDURE — ? MEDICARE ABN

## 2022-06-01 PROCEDURE — 17000 DESTRUCT PREMALG LESION: CPT | Mod: 59

## 2022-06-01 PROCEDURE — ? LIQUID NITROGEN

## 2022-06-01 PROCEDURE — 17003 DESTRUCT PREMALG LES 2-14: CPT | Mod: 59

## 2022-06-01 PROCEDURE — ? SUNSCREEN RECOMMENDATIONS

## 2022-06-01 PROCEDURE — ? ADDITIONAL NOTES

## 2022-06-01 PROCEDURE — ? FULL BODY SKIN EXAM

## 2022-06-01 PROCEDURE — 99213 OFFICE O/P EST LOW 20 MIN: CPT | Mod: 25

## 2022-06-01 PROCEDURE — 17110 DESTRUCTION B9 LES UP TO 14: CPT

## 2022-06-01 PROCEDURE — ? COUNSELING

## 2022-06-01 ASSESSMENT — LOCATION DETAILED DESCRIPTION DERM
LOCATION DETAILED: UPPER STERNUM
LOCATION DETAILED: LEFT LATERAL ABDOMEN
LOCATION DETAILED: RIGHT ANTECUBITAL SKIN
LOCATION DETAILED: LEFT INFERIOR LATERAL NECK
LOCATION DETAILED: RIGHT RIB CAGE
LOCATION DETAILED: RIGHT MEDIAL TRAPEZIAL NECK
LOCATION DETAILED: LEFT RIB CAGE
LOCATION DETAILED: LEFT BUTTOCK
LOCATION DETAILED: PERIUMBILICAL SKIN
LOCATION DETAILED: RIGHT LATERAL ABDOMEN
LOCATION DETAILED: RIGHT CENTRAL BUCCAL CHEEK
LOCATION DETAILED: RIGHT RADIAL DORSAL HAND
LOCATION DETAILED: RIGHT DISTAL POSTERIOR UPPER ARM
LOCATION DETAILED: RIGHT POSTERIOR SHOULDER
LOCATION DETAILED: RIGHT PROXIMAL DORSAL MIDDLE FINGER
LOCATION DETAILED: RIGHT INFERIOR LATERAL NECK
LOCATION DETAILED: LEFT INFERIOR MEDIAL MALAR CHEEK
LOCATION DETAILED: LEFT INFERIOR ANTERIOR NECK

## 2022-06-01 ASSESSMENT — LOCATION ZONE DERM
LOCATION ZONE: FACE
LOCATION ZONE: FINGER
LOCATION ZONE: NECK
LOCATION ZONE: HAND
LOCATION ZONE: FACE
LOCATION ZONE: NECK
LOCATION ZONE: TRUNK
LOCATION ZONE: ARM

## 2022-06-01 ASSESSMENT — LOCATION SIMPLE DESCRIPTION DERM
LOCATION SIMPLE: RIGHT SHOULDER
LOCATION SIMPLE: RIGHT CHEEK
LOCATION SIMPLE: RIGHT MIDDLE FINGER
LOCATION SIMPLE: RIGHT POSTERIOR UPPER ARM
LOCATION SIMPLE: CHEST
LOCATION SIMPLE: ABDOMEN
LOCATION SIMPLE: LEFT BUTTOCK
LOCATION SIMPLE: LEFT ANTERIOR NECK
LOCATION SIMPLE: POSTERIOR NECK
LOCATION SIMPLE: LEFT CHEEK
LOCATION SIMPLE: RIGHT HAND
LOCATION SIMPLE: RIGHT UPPER ARM
LOCATION SIMPLE: RIGHT ANTERIOR NECK
LOCATION SIMPLE: LEFT ANTERIOR NECK

## 2022-06-01 NOTE — HPI: EVALUATION OF SKIN LESION(S)
What Type Of Note Output Would You Prefer (Optional)?: Bullet Format
Hpi Title: Evaluation of Skin Lesions
How Severe Are Your Spot(S)?: mild
Have Your Spot(S) Been Treated In The Past?: has not been treated
Additional History: Pt has a skin lesion on the back of her neck as well as on her right arm & another on her lip. \\nLast seen on 02/22 by NICK

## 2022-06-01 NOTE — PROCEDURE: LIQUID NITROGEN
Render Post-Care Instructions In Note?: no
Spray Paint Text: The liquid nitrogen was applied to the skin utilizing a spray paint frosting technique.
Show Topical Anesthesia Variable?: Yes
Post-Care Instructions: I reviewed with the patient in detail post-care instructions. Patient is to wear sunprotection, and avoid picking at any of the treated lesions. Pt may apply Vaseline to crusted or scabbing areas.
Consent: The patient's consent was obtained including but not limited to risks of crusting, scabbing, blistering, scarring, darker or lighter pigmentary change, recurrence, incomplete removal and infection.
Detail Level: Detailed
Medical Necessity Clause: This procedure was medically necessary because the lesions that were treated were:
Medical Necessity Information: It is in your best interest to select a reason for this procedure from the list below. All of these items fulfill various CMS LCD requirements except the new and changing color options.
Detail Level: Simple
Duration Of Freeze Thaw-Cycle (Seconds): 0

## 2022-06-01 NOTE — PROCEDURE: MEDICARE ABN
Payment Option: Option 1: Bill Medicare, await for decision on payment.
Reason?: non-covered service
Detail Level: Detailed
Reason?: Additional Information
Procedure (Limit To 20 Characters): brace

## 2023-02-01 ENCOUNTER — APPOINTMENT (RX ONLY)
Dept: URBAN - METROPOLITAN AREA CLINIC 44 | Facility: CLINIC | Age: 88
Setting detail: DERMATOLOGY
End: 2023-02-01

## 2023-02-01 DIAGNOSIS — L81.4 OTHER MELANIN HYPERPIGMENTATION: ICD-10-CM

## 2023-02-01 DIAGNOSIS — L57.0 ACTINIC KERATOSIS: ICD-10-CM

## 2023-02-01 DIAGNOSIS — L57.8 OTHER SKIN CHANGES DUE TO CHRONIC EXPOSURE TO NONIONIZING RADIATION: ICD-10-CM

## 2023-02-01 DIAGNOSIS — A63.0 ANOGENITAL (VENEREAL) WARTS: ICD-10-CM

## 2023-02-01 DIAGNOSIS — L82.1 OTHER SEBORRHEIC KERATOSIS: ICD-10-CM

## 2023-02-01 DIAGNOSIS — L72.8 OTHER FOLLICULAR CYSTS OF THE SKIN AND SUBCUTANEOUS TISSUE: ICD-10-CM

## 2023-02-01 DIAGNOSIS — Z87.2 PERSONAL HISTORY OF DISEASES OF THE SKIN AND SUBCUTANEOUS TISSUE: ICD-10-CM

## 2023-02-01 DIAGNOSIS — D18.0 HEMANGIOMA: ICD-10-CM

## 2023-02-01 DIAGNOSIS — Z85.828 PERSONAL HISTORY OF OTHER MALIGNANT NEOPLASM OF SKIN: ICD-10-CM

## 2023-02-01 DIAGNOSIS — D22 MELANOCYTIC NEVI: ICD-10-CM

## 2023-02-01 PROBLEM — D22.5 MELANOCYTIC NEVI OF TRUNK: Status: ACTIVE | Noted: 2023-02-01

## 2023-02-01 PROBLEM — D18.01 HEMANGIOMA OF SKIN AND SUBCUTANEOUS TISSUE: Status: ACTIVE | Noted: 2023-02-01

## 2023-02-01 PROCEDURE — ? COUNSELING

## 2023-02-01 PROCEDURE — ? FULL BODY SKIN EXAM

## 2023-02-01 PROCEDURE — 99213 OFFICE O/P EST LOW 20 MIN: CPT | Mod: 25

## 2023-02-01 PROCEDURE — ? INCISION AND DRAINAGE

## 2023-02-01 PROCEDURE — 17003 DESTRUCT PREMALG LES 2-14: CPT

## 2023-02-01 PROCEDURE — ? SUNSCREEN RECOMMENDATIONS

## 2023-02-01 PROCEDURE — ? LIQUID NITROGEN

## 2023-02-01 PROCEDURE — ? ADDITIONAL NOTES

## 2023-02-01 PROCEDURE — ? TREATMENT REGIMEN

## 2023-02-01 PROCEDURE — 10060 I&D ABSCESS SIMPLE/SINGLE: CPT

## 2023-02-01 PROCEDURE — 17000 DESTRUCT PREMALG LESION: CPT

## 2023-02-01 ASSESSMENT — LOCATION DETAILED DESCRIPTION DERM
LOCATION DETAILED: LEFT SUPERIOR UPPER BACK
LOCATION DETAILED: PERIUMBILICAL SKIN
LOCATION DETAILED: RIGHT CENTRAL BUCCAL CHEEK
LOCATION DETAILED: RIGHT LATERAL ABDOMEN
LOCATION DETAILED: NASAL SUPRATIP
LOCATION DETAILED: LEFT ANTERIOR SHOULDER
LOCATION DETAILED: RIGHT RIB CAGE
LOCATION DETAILED: RIGHT INFERIOR LATERAL NECK
LOCATION DETAILED: LEFT INFERIOR CENTRAL MALAR CHEEK
LOCATION DETAILED: LEFT RIB CAGE
LOCATION DETAILED: LEFT BUTTOCK
LOCATION DETAILED: RIGHT INFERIOR CENTRAL MALAR CHEEK
LOCATION DETAILED: RIGHT PROXIMAL DORSAL THUMB
LOCATION DETAILED: RIGHT LABIUM MINUS
LOCATION DETAILED: RIGHT RADIAL DORSAL HAND
LOCATION DETAILED: LEFT INFERIOR ANTERIOR NECK
LOCATION DETAILED: LEFT LATERAL TRAPEZIAL NECK
LOCATION DETAILED: LEFT LATERAL ABDOMEN
LOCATION DETAILED: LEFT INFERIOR MEDIAL MALAR CHEEK
LOCATION DETAILED: RIGHT BUTTOCK
LOCATION DETAILED: RIGHT DORSAL INDEX FINGER METACARPOPHALANGEAL JOINT

## 2023-02-01 ASSESSMENT — LOCATION SIMPLE DESCRIPTION DERM
LOCATION SIMPLE: RIGHT CHEEK
LOCATION SIMPLE: POSTERIOR NECK
LOCATION SIMPLE: LEFT BUTTOCK
LOCATION SIMPLE: RIGHT BUTTOCK
LOCATION SIMPLE: LEFT CHEEK
LOCATION SIMPLE: LEFT CHEEK
LOCATION SIMPLE: LEFT ANTERIOR NECK
LOCATION SIMPLE: LABIA MINORA
LOCATION SIMPLE: LEFT SHOULDER
LOCATION SIMPLE: LEFT UPPER BACK
LOCATION SIMPLE: NOSE
LOCATION SIMPLE: RIGHT THUMB
LOCATION SIMPLE: ABDOMEN
LOCATION SIMPLE: RIGHT HAND
LOCATION SIMPLE: RIGHT ANTERIOR NECK

## 2023-02-01 ASSESSMENT — LOCATION ZONE DERM
LOCATION ZONE: NOSE
LOCATION ZONE: FINGER
LOCATION ZONE: NECK
LOCATION ZONE: VULVA
LOCATION ZONE: NECK
LOCATION ZONE: TRUNK
LOCATION ZONE: FACE
LOCATION ZONE: HAND
LOCATION ZONE: ARM
LOCATION ZONE: FACE

## 2023-02-01 NOTE — HPI: EVALUATION OF SKIN LESION(S)
What Type Of Note Output Would You Prefer (Optional)?: Bullet Format
Hpi Title: Evaluation of Skin Lesions
How Severe Are Your Spot(S)?: mild
Have Your Spot(S) Been Treated In The Past?: has not been treated
Additional History: EST pt last seen June 2022 by NICK\\n\\nPt presents for FBE with concerning spots on Left shoulder and right hand

## 2023-02-01 NOTE — PROCEDURE: ADDITIONAL NOTES
Detail Level: Simple
Additional Notes: Patient consent was obtained to proceed with the visit and recommended plan of care after discussion of all risks and benefits, including the risks of COVID-19 exposure.
Additional Notes: Dr. 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
Render Risk Assessment In Note?: no

## 2023-02-01 NOTE — PROCEDURE: TREATMENT REGIMEN
Initiate Treatment: -R.Essentials brightening wash (sold in office) Dr. Watt recommended that for patient today for her darker sun spots on face.
Detail Level: Zone

## 2023-02-01 NOTE — PROCEDURE: INCISION AND DRAINAGE
Detail Level: Detailed
Lesion Type: Cyst
Method: 11 blade
Curette: No
Anesthesia Type: 1% lidocaine with epinephrine
Size Of Lesion In Cm (Optional But May Be Required For Some Insurances): 0
Drainage Amount?: minimal
Drainage Type?: cyst-like
Wound Care: Petrolatum
Dressing: dry sterile dressing
Epidermal Sutures: 4-0 Ethilon
Epidermal Closure: simple interrupted
Suture Text: The incision was partially closed with
Preparation Text: The area was prepped in the usual clean fashion.
Curette Text (Optional): After the contents were expressed a curette was used to partially remove the cyst wall.
Consent was obtained and risks were reviewed including but not limited to delayed wound healing, infection, need for multiple I and D's, and pain.
Post-Care Instructions: I reviewed with the patient in detail post-care instructions. Patient should keep wound covered and call the office should any redness, pain, swelling or worsening occur.

## 2023-08-02 ENCOUNTER — APPOINTMENT (RX ONLY)
Dept: URBAN - METROPOLITAN AREA CLINIC 44 | Facility: CLINIC | Age: 88
Setting detail: DERMATOLOGY
End: 2023-08-02

## 2023-08-02 DIAGNOSIS — R23.3 SPONTANEOUS ECCHYMOSES: ICD-10-CM

## 2023-08-02 DIAGNOSIS — L81.4 OTHER MELANIN HYPERPIGMENTATION: ICD-10-CM

## 2023-08-02 DIAGNOSIS — D18.0 HEMANGIOMA: ICD-10-CM

## 2023-08-02 DIAGNOSIS — L57.0 ACTINIC KERATOSIS: ICD-10-CM

## 2023-08-02 DIAGNOSIS — D22 MELANOCYTIC NEVI: ICD-10-CM

## 2023-08-02 DIAGNOSIS — Z12.83 ENCOUNTER FOR SCREENING FOR MALIGNANT NEOPLASM OF SKIN: ICD-10-CM

## 2023-08-02 DIAGNOSIS — Z85.828 PERSONAL HISTORY OF OTHER MALIGNANT NEOPLASM OF SKIN: ICD-10-CM

## 2023-08-02 DIAGNOSIS — L82.1 OTHER SEBORRHEIC KERATOSIS: ICD-10-CM

## 2023-08-02 DIAGNOSIS — L57.8 OTHER SKIN CHANGES DUE TO CHRONIC EXPOSURE TO NONIONIZING RADIATION: ICD-10-CM

## 2023-08-02 PROBLEM — D18.01 HEMANGIOMA OF SKIN AND SUBCUTANEOUS TISSUE: Status: ACTIVE | Noted: 2023-08-02

## 2023-08-02 PROBLEM — D22.5 MELANOCYTIC NEVI OF TRUNK: Status: ACTIVE | Noted: 2023-08-02

## 2023-08-02 PROCEDURE — ? COUNSELING

## 2023-08-02 PROCEDURE — ? LIQUID NITROGEN

## 2023-08-02 PROCEDURE — ? SUNSCREEN RECOMMENDATIONS

## 2023-08-02 PROCEDURE — 17000 DESTRUCT PREMALG LESION: CPT

## 2023-08-02 PROCEDURE — ? TREATMENT REGIMEN

## 2023-08-02 PROCEDURE — 17003 DESTRUCT PREMALG LES 2-14: CPT

## 2023-08-02 PROCEDURE — ? FULL BODY SKIN EXAM

## 2023-08-02 PROCEDURE — 99213 OFFICE O/P EST LOW 20 MIN: CPT | Mod: 25

## 2023-08-02 PROCEDURE — ? MEDICARE ABN

## 2023-08-02 ASSESSMENT — LOCATION SIMPLE DESCRIPTION DERM
LOCATION SIMPLE: RIGHT THUMB
LOCATION SIMPLE: LEFT ANTERIOR NECK
LOCATION SIMPLE: RIGHT ANTERIOR NECK
LOCATION SIMPLE: RIGHT LOWER BACK
LOCATION SIMPLE: ABDOMEN
LOCATION SIMPLE: LEFT BREAST
LOCATION SIMPLE: LEFT CHEEK
LOCATION SIMPLE: LEFT ANTERIOR NECK
LOCATION SIMPLE: RIGHT CHEEK

## 2023-08-02 ASSESSMENT — LOCATION DETAILED DESCRIPTION DERM
LOCATION DETAILED: LEFT INFERIOR ANTERIOR NECK
LOCATION DETAILED: PERIUMBILICAL SKIN
LOCATION DETAILED: LEFT INFERIOR ANTERIOR NECK
LOCATION DETAILED: RIGHT INFERIOR LATERAL NECK
LOCATION DETAILED: RIGHT INFERIOR MEDIAL MIDBACK
LOCATION DETAILED: LEFT INFERIOR MEDIAL MALAR CHEEK
LOCATION DETAILED: LEFT INFERIOR LATERAL NECK
LOCATION DETAILED: DORSAL INTERPHALANGEAL JOINT RIGHT THUMB
LOCATION DETAILED: LEFT RIB CAGE
LOCATION DETAILED: RIGHT RIB CAGE
LOCATION DETAILED: RIGHT CENTRAL BUCCAL CHEEK
LOCATION DETAILED: RIGHT LATERAL ABDOMEN
LOCATION DETAILED: LEFT LATERAL BREAST 12-1:00 REGION

## 2023-08-02 ASSESSMENT — LOCATION ZONE DERM
LOCATION ZONE: NECK
LOCATION ZONE: FACE
LOCATION ZONE: TRUNK
LOCATION ZONE: NECK
LOCATION ZONE: FACE
LOCATION ZONE: FINGER

## 2023-08-02 NOTE — HPI: EVALUATION OF SKIN LESION(S)
What Type Of Note Output Would You Prefer (Optional)?: Bullet Format
Hpi Title: Evaluation of Skin Lesions
How Severe Are Your Spot(S)?: mild
Have Your Spot(S) Been Treated In The Past?: has not been treated
Additional History: EST pt last seen by Dr.Jodi Watt 02/2023\\nFull skin exam with lesion of concern on left breast

## 2023-08-02 NOTE — PROCEDURE: LIQUID NITROGEN
Show Aperture Variable?: Yes
Render Post-Care Instructions In Note?: no
Consent: The patient's consent was obtained including but not limited to risks of crusting, scabbing, blistering, scarring, darker or lighter pigmentary change, recurrence, incomplete removal and infection.
Post-Care Instructions: I reviewed with the patient in detail post-care instructions. Patient is to wear sunprotection, and avoid picking at any of the treated lesions. Pt may apply Vaseline to crusted or scabbing areas.
Duration Of Freeze Thaw-Cycle (Seconds): 0
Detail Level: Simple

## 2023-08-02 NOTE — PROCEDURE: MEDICARE ABN
Payment Option: Option 1: Bill Medicare, await for decision on payment.
Detail Level: Detailed
Procedure (Limit To 20 Characters): brace
Reason?: Additional Information
Reason?: non-covered service

## 2024-05-01 ENCOUNTER — APPOINTMENT (RX ONLY)
Dept: URBAN - METROPOLITAN AREA CLINIC 45 | Facility: CLINIC | Age: 89
Setting detail: DERMATOLOGY
End: 2024-05-01

## 2024-05-01 DIAGNOSIS — D22 MELANOCYTIC NEVI: ICD-10-CM

## 2024-05-01 DIAGNOSIS — L82.1 OTHER SEBORRHEIC KERATOSIS: ICD-10-CM

## 2024-05-01 DIAGNOSIS — L81.4 OTHER MELANIN HYPERPIGMENTATION: ICD-10-CM

## 2024-05-01 DIAGNOSIS — Z85.828 PERSONAL HISTORY OF OTHER MALIGNANT NEOPLASM OF SKIN: ICD-10-CM

## 2024-05-01 DIAGNOSIS — Z87.2 PERSONAL HISTORY OF DISEASES OF THE SKIN AND SUBCUTANEOUS TISSUE: ICD-10-CM

## 2024-05-01 PROBLEM — D48.5 NEOPLASM OF UNCERTAIN BEHAVIOR OF SKIN: Status: ACTIVE | Noted: 2024-05-01

## 2024-05-01 PROBLEM — D22.39 MELANOCYTIC NEVI OF OTHER PARTS OF FACE: Status: ACTIVE | Noted: 2024-05-01

## 2024-05-01 PROBLEM — D22.61 MELANOCYTIC NEVI OF RIGHT UPPER LIMB, INCLUDING SHOULDER: Status: ACTIVE | Noted: 2024-05-01

## 2024-05-01 PROCEDURE — ? FULL BODY SKIN EXAM - DECLINED

## 2024-05-01 PROCEDURE — ? COUNSELING

## 2024-05-01 PROCEDURE — 11102 TANGNTL BX SKIN SINGLE LES: CPT

## 2024-05-01 PROCEDURE — ? BIOPSY BY SHAVE METHOD

## 2024-05-01 PROCEDURE — 99213 OFFICE O/P EST LOW 20 MIN: CPT | Mod: 25

## 2024-05-01 PROCEDURE — ? SUNSCREEN RECOMMENDATIONS

## 2024-05-01 ASSESSMENT — LOCATION SIMPLE DESCRIPTION DERM
LOCATION SIMPLE: RIGHT CHEEK
LOCATION SIMPLE: LEFT FOREARM
LOCATION SIMPLE: POSTERIOR NECK
LOCATION SIMPLE: RIGHT SHOULDER
LOCATION SIMPLE: POSTERIOR NECK
LOCATION SIMPLE: RIGHT FOREARM

## 2024-05-01 ASSESSMENT — LOCATION DETAILED DESCRIPTION DERM
LOCATION DETAILED: LEFT POSTERIOR NECK
LOCATION DETAILED: RIGHT VENTRAL PROXIMAL FOREARM
LOCATION DETAILED: RIGHT INFERIOR MEDIAL MALAR CHEEK
LOCATION DETAILED: RIGHT ANTERIOR SHOULDER
LOCATION DETAILED: LEFT VENTRAL PROXIMAL FOREARM
LOCATION DETAILED: RIGHT POSTERIOR NECK
LOCATION DETAILED: RIGHT DISTAL DORSAL FOREARM

## 2024-05-01 ASSESSMENT — LOCATION ZONE DERM
LOCATION ZONE: NECK
LOCATION ZONE: NECK
LOCATION ZONE: FACE
LOCATION ZONE: ARM
LOCATION ZONE: ARM

## 2024-08-21 ENCOUNTER — APPOINTMENT (RX ONLY)
Dept: URBAN - METROPOLITAN AREA CLINIC 44 | Facility: CLINIC | Age: 89
Setting detail: DERMATOLOGY
End: 2024-08-21

## 2024-08-21 DIAGNOSIS — L72.8 OTHER FOLLICULAR CYSTS OF THE SKIN AND SUBCUTANEOUS TISSUE: ICD-10-CM

## 2024-08-21 DIAGNOSIS — L57.0 ACTINIC KERATOSIS: ICD-10-CM

## 2024-08-21 PROBLEM — D48.5 NEOPLASM OF UNCERTAIN BEHAVIOR OF SKIN: Status: ACTIVE | Noted: 2024-08-21

## 2024-08-21 PROCEDURE — ? INCISION AND DRAINAGE

## 2024-08-21 PROCEDURE — 10060 I&D ABSCESS SIMPLE/SINGLE: CPT

## 2024-08-21 PROCEDURE — ? LIQUID NITROGEN

## 2024-08-21 PROCEDURE — 17000 DESTRUCT PREMALG LESION: CPT | Mod: 59

## 2024-08-21 PROCEDURE — ? BIOPSY BY SHAVE METHOD

## 2024-08-21 PROCEDURE — 11102 TANGNTL BX SKIN SINGLE LES: CPT

## 2024-08-21 ASSESSMENT — LOCATION ZONE DERM: LOCATION ZONE: NECK

## 2024-08-21 ASSESSMENT — LOCATION SIMPLE DESCRIPTION DERM: LOCATION SIMPLE: LEFT ANTERIOR NECK

## 2024-08-21 ASSESSMENT — LOCATION DETAILED DESCRIPTION DERM
LOCATION DETAILED: LEFT SUPERIOR ANTERIOR NECK
LOCATION DETAILED: LEFT INFERIOR LATERAL NECK

## 2024-08-21 NOTE — HPI: SKIN LESION (PATIENT REPORTED)
Where Is Your Skin Lesion Of Concern Located?: Right lower leg left side of neck
Additional Comments (Use Complete Sentences): Established patient \\nPatient states she has a lesion on her right lower leg and leg side of her neck that she would like evaluated. She states lesion on neck has previously been treated with ln2\\nLast FSE 5/2024\\nJDL patient

## 2024-09-12 ENCOUNTER — APPOINTMENT (RX ONLY)
Dept: URBAN - METROPOLITAN AREA CLINIC 45 | Facility: CLINIC | Age: 89
Setting detail: DERMATOLOGY
End: 2024-09-12

## 2024-09-12 DIAGNOSIS — S31000A OPEN WOUND(S) (MULTIPLE) OF UNSPECIFIED SITE(S), WITHOUT MENTION OF COMPLICATION: ICD-10-CM | Status: INADEQUATELY CONTROLLED

## 2024-09-12 PROBLEM — S81.001A UNSPECIFIED OPEN WOUND, RIGHT KNEE, INITIAL ENCOUNTER: Status: ACTIVE | Noted: 2024-09-12

## 2024-09-12 PROBLEM — C44.722 SQUAMOUS CELL CARCINOMA OF SKIN OF RIGHT LOWER LIMB, INCLUDING HIP: Status: ACTIVE | Noted: 2024-09-12

## 2024-09-12 PROCEDURE — 12031 INTMD RPR S/A/T/EXT 2.5 CM/<: CPT

## 2024-09-12 PROCEDURE — ? MOHS SURGERY

## 2024-09-12 PROCEDURE — ? TREATMENT REGIMEN

## 2024-09-12 PROCEDURE — 99213 OFFICE O/P EST LOW 20 MIN: CPT | Mod: 25

## 2024-09-12 PROCEDURE — 17313 MOHS 1 STAGE T/A/L: CPT

## 2024-09-12 PROCEDURE — ? POST-OP WOUND CHECK (NO GLOBAL PERIOD)

## 2024-09-12 ASSESSMENT — LOCATION DETAILED DESCRIPTION DERM: LOCATION DETAILED: RIGHT KNEE

## 2024-09-12 ASSESSMENT — LOCATION ZONE DERM: LOCATION ZONE: LEG

## 2024-09-12 ASSESSMENT — LOCATION SIMPLE DESCRIPTION DERM: LOCATION SIMPLE: RIGHT KNEE

## 2024-09-12 NOTE — PROCEDURE: MOHS SURGERY
Mohs Case Number: 
Date Of Previous Biopsy (Optional): 8/21/24
Previous Accession (Optional): [id number]
Biopsy Photograph Reviewed: Yes
Consent Type: Consent 1 (Standard)
Eye Shield Used: No
Initial Size Of Lesion: 0.8
X Size Of Lesion In Cm (Optional): 0.7
Number Of Stages: 1
Primary Defect Length In Cm (Final Defect Size - Required For Flaps/Grafts): 0.9
Primary Defect Depth In Cm (Optional But Required For Some Insurers): 0
Repair Type: Intermediate Layered Repair
Which Instrument Did You Use For Dermabrasion?: Wire Brush
Which Eyelid Repair Cpt Are You Using?: 75699
Oculoplastic Surgeon Procedure Text (A): After obtaining clear surgical margins the patient was sent to oculoplastics for surgical repair.  The patient understands they will receive post-surgical care and follow-up from the referring physician's office.
Otolaryngologist Procedure Text (A): After obtaining clear surgical margins the patient was sent to otolaryngology for surgical repair.  The patient understands they will receive post-surgical care and follow-up from the referring physician's office.
Plastic Surgeon Procedure Text (A): After obtaining clear surgical margins the patient was sent to plastics for surgical repair.  The patient understands they will receive post-surgical care and follow-up from the referring physician's office.
Mid-Level Procedure Text (A): After obtaining clear surgical margins the patient was sent to a mid-level provider for surgical repair.  The patient understands they will receive post-surgical care and follow-up from the mid-level provider.
Provider Procedure Text (A): After obtaining clear surgical margins the defect was repaired by another provider.
Asc Procedure Text (A): After obtaining clear surgical margins the patient was sent to an ASC for surgical repair.  The patient understands they will receive post-surgical care and follow-up from the ASC physician.
Suturegard Retention Suture: 2-0 Nylon
Retention Suture Bite Size: 3 mm
Length To Time In Minutes Device Was In Place: 10
Undermining Type: Entire Wound
Debridement Text: The wound edges were debrided prior to proceeding with the closure to facilitate wound healing.
Helical Rim Text: The closure involved the helical rim WHICH IS A FREE MARGIN OF THE EAR AND THEREFORE INDICATION FOR COMPLEX REPAIR.
Vermilion Border Text: The closure involved the vermilion border, WHICH IS A FREE MARGIN OF THE LIP AND THEREFORE INDICATION FOR COMPLEX REPAIR.
Nostril Rim Text: The closure involved the nostril rim, WHICH IS A FREE MARGIN AND THEREFORE INDICATION FOR COMPLEX REPAIR.
Retention Suture Text: Retention sutures were placed to support the closure, prevent dehiscence, and achieve a better cosmetic outcome.  The necessary placement of these retention sutures is an indication for complex repair.
Location Indication Override (Is Already Calculated Based On Selected Body Location): Area M
Area H Indication Text: Tumors in this location are included in Area H (eyelids, eyebrows, nose, lips, chin, ear, pre-auricular, post-auricular, temple, genitalia, hands, feet, ankles and areola).  Tissue conservation is critical in these anatomic locations.
Area M Indication Text: Tumors in this location are included in Area M (cheek, forehead, scalp, neck, jawline and pretibial skin).  Mohs surgery is indicated for tumors in these anatomic locations.
Area L Indication Text: Tumors in this location are included in Area L (trunk and extremities).  Mohs surgery is indicated for larger tumors, or tumors with aggressive histologic features, in these anatomic locations.
Tumor Debulked?: curette
Depth Of Tumor Invasion (For Histology): tumor not visualized (deep and peripheral margins are clear of tumor)
Perineural Invasion (For Histology - Be Specific If Possible): absent
Stage 1: Number Of Blocks?: 2
Special Stains Stage 1 - Results: Base On Clearance Noted Above
Stage 2: Additional Anesthesia Type: 1% lidocaine with epinephrine and a 1:10 solution of 8.4% sodium bicarbonate
Staging Info: By selecting yes to the question above you will include information on AJCC 8 tumor staging in your Mohs note. Information on tumor staging will be automatically added for SCCs on the head and neck. AJCC 8 includes tumor size, tumor depth, perineural involvement and bone invasion.
Tumor Depth: Less than 6mm from granular layer and no invasion beyond the subcutaneous fat
Why Was The Change Made?: Please Select the Appropriate Response
Medical Necessity Statement: Based on the clinical judgement of myself and the referring provider, Mohs surgery is the most appropriate treatment for this cancer compared to other treatments.
Alternatives Discussed Intro (Do Not Add Period): I discussed alternative treatments to Mohs surgery and specifically discussed the risks and benefits of
Consent 1/Introductory Paragraph: The rationale for Mohs was explained to the patient and consent was obtained. The technique, risks, benefits and alternatives to therapy were discussed in detail. Specifically, the risks of infection, scarring, tissue deformity, bleeding, prolonged wound healing, incomplete removal, allergy to anesthesia or bandage material, nerve injury and recurrence were addressed. Prior to the procedure, the treatment site was clearly identified and confirmed by the patient with mirror. All components of Universal Protocol/PAUSE Rule completed.
Consent 2/Introductory Paragraph: Mohs surgery was explained to the patient and consent was obtained. The risks, benefits and alternatives to therapy were discussed in detail. Specifically, the risks of infection, scarring, bleeding, prolonged wound healing, incomplete removal, allergy to anesthesia, nerve injury and recurrence were addressed. Prior to the procedure, the treatment site was clearly identified and confirmed by the patient. All components of Universal Protocol/PAUSE Rule completed.
Consent 3/Introductory Paragraph: I gave the patient a chance to ask questions they had about the procedure.  Following this I explained the Mohs procedure and consent was obtained. The risks, benefits and alternatives to therapy were discussed in detail. Specifically, the risks of infection, scarring, bleeding, prolonged wound healing, incomplete removal, allergy to anesthesia, nerve injury and recurrence were addressed. Prior to the procedure, the treatment site was clearly identified and confirmed by the patient. All components of Universal Protocol/PAUSE Rule completed.
Consent (Temporal Branch)/Introductory Paragraph: The rationale for Mohs was explained to the patient and consent was obtained. The risks, benefits and alternatives to therapy were discussed in detail. Specifically, the risks of damage to the temporal branch of the facial nerve, infection, scarring, bleeding, prolonged wound healing, incomplete removal, allergy to anesthesia, and recurrence were addressed. Prior to the procedure, the treatment site was clearly identified and confirmed by the patient. All components of Universal Protocol/PAUSE Rule completed.
Consent (Marginal Mandibular)/Introductory Paragraph: The rationale for Mohs was explained to the patient and consent was obtained. The risks, benefits and alternatives to therapy were discussed in detail. Specifically, the risks of damage to the marginal mandibular branch of the facial nerve, infection, scarring, bleeding, prolonged wound healing, incomplete removal, allergy to anesthesia, and recurrence were addressed. Prior to the procedure, the treatment site was clearly identified and confirmed by the patient. All components of Universal Protocol/PAUSE Rule completed.
Consent (Spinal Accessory)/Introductory Paragraph: The rationale for Mohs was explained to the patient and consent was obtained. The risks, benefits and alternatives to therapy were discussed in detail. Specifically, the risks of damage to the spinal accessory nerve, infection, scarring, bleeding, prolonged wound healing, incomplete removal, allergy to anesthesia, and recurrence were addressed. Prior to the procedure, the treatment site was clearly identified and confirmed by the patient. All components of Universal Protocol/PAUSE Rule completed.
Consent (Near Eyelid Margin)/Introductory Paragraph: The rationale for Mohs was explained to the patient and consent was obtained. The risks, benefits and alternatives to therapy were discussed in detail. Specifically, the risks of ectropion or eyelid deformity, infection, scarring, bleeding, prolonged wound healing, incomplete removal, allergy to anesthesia, nerve injury and recurrence were addressed. Prior to the procedure, the treatment site was clearly identified and confirmed by the patient. All components of Universal Protocol/PAUSE Rule completed.
Consent (Ear)/Introductory Paragraph: The rationale for Mohs was explained to the patient and consent was obtained. The risks, benefits and alternatives to therapy were discussed in detail. Specifically, the risks of ear deformity, infection, scarring, bleeding, prolonged wound healing, incomplete removal, allergy to anesthesia, nerve injury and recurrence were addressed. Prior to the procedure, the treatment site was clearly identified and confirmed by the patient. All components of Universal Protocol/PAUSE Rule completed.
Consent (Nose)/Introductory Paragraph: The rationale for Mohs was explained to the patient and consent was obtained. The risks, benefits and alternatives to therapy were discussed in detail. Specifically, the risks of nasal deformity, changes in the flow of air through the nose, infection, scarring, bleeding, prolonged wound healing, incomplete removal, allergy to anesthesia, nerve injury and recurrence were addressed. Prior to the procedure, the treatment site was clearly identified and confirmed by the patient. All components of Universal Protocol/PAUSE Rule completed.
Consent (Lip)/Introductory Paragraph: The rationale for Mohs was explained to the patient and consent was obtained. The risks, benefits and alternatives to therapy were discussed in detail. Specifically, the risks of lip deformity, changes in the oral aperture, infection, scarring, bleeding, prolonged wound healing, incomplete removal, allergy to anesthesia, nerve injury and recurrence were addressed. Prior to the procedure, the treatment site was clearly identified and confirmed by the patient. All components of Universal Protocol/PAUSE Rule completed.
Consent (Scalp)/Introductory Paragraph: The rationale for Mohs was explained to the patient and consent was obtained. The risks, benefits and alternatives to therapy were discussed in detail. Specifically, the risks of changes in hair growth pattern secondary to repair, infection, scarring, bleeding, prolonged wound healing, incomplete removal, allergy to anesthesia, nerve injury and recurrence were addressed. Prior to the procedure, the treatment site was clearly identified and confirmed by the patient. All components of Universal Protocol/PAUSE Rule completed.
Detail Level: Detailed
Postop Diagnosis: same
Anesthesia Volume In Cc: 6
Hemostasis: Electrocautery
Estimated Blood Loss (Cc): minimal
Stage 6: Additional Anesthesia Type: 1% lidocaine with 1:100,000 epinephrine and a 1:10 solution of 8.4% sodium bicarbonate
Repair Hemostasis (Optional): Electrodesiccation
Brow Lift Text: A midfrontal incision was made medially to the defect to allow access to the tissues just superior to the left eyebrow. Following careful dissection inferiorly in a supraperiosteal plane to the level of the left eyebrow, several 3-0 monocryl sutures were used to resuspend the eyebrow orbicularis oculi muscular unit to the superior frontal bone periosteum. This resulted in an appropriate reapproximation of static eyebrow symmetry and correction of the left brow ptosis.
Deep Sutures: 4-0 Monocryl
Epidermal Sutures: Dermabond
Suturegard Intro: Intraoperative tissue expansion was performed, utilizing the SUTUREGARD device, in order to reduce wound tension.
Suturegard Body: The suture ends were repeatedly re-tightened and re-clamped to achieve the desired tissue expansion.
Hemigard Intro: Due to skin fragility and wound tension, it was decided to use HEMIGARD adhesive retention suture devices to permit a linear closure. The skin was cleaned and dried for a 6cm distance away from the wound. Excessive hair, if present, was removed to allow for adhesion.
Hemigard Postcare Instructions: The HEMIGARD strips are to remain completely dry for at least 5-7 days.
Donor Site Anesthesia Type: same as repair anesthesia
Epidermal Closure Graft Donor Site (Optional): running
Graft Donor Site Bandage (Optional-Leave Blank If You Don't Want In Note): Pressure bandage was applied to the donor site.
Closure 2 Information: This tab is for additional flaps and grafts, including complex repair and grafts and complex repair and flaps. You can also specify a different location for the additional defect, if the location is the same you do not need to select a new one. We will insert the automated text for the repair you select below just as we do for solitary flaps and grafts. Please note that at this time if you select a location with a different insurance zone you will need to override the ICD10 and CPT if appropriate.
Closure 3 Information: This tab is for additional flaps and grafts above and beyond our usual structured repairs.  Please note if you enter information here it will not currently bill and you will need to add the billing information manually.
Wound Care: Aquaphor
Dressing: pressure dressing
Wound Care (No Sutures): Petrolatum
Dressing (No Sutures): dry sterile dressing
Unna Boot Text: An Unna boot was placed to help immobilize the limb and facilitate more rapid healing.
Home Suture Removal Text: Patient was provided instructions on removing sutures and will remove their sutures at home.  If they have any questions or difficulties they will call the office.
Post-Care Instructions: We reviewed with the patient in detail post-care instructions. Patient is not to engage in any heavy lifting, exercise, or swimming for the next 7 days. Should the patient develop any fevers, chills, increasing redness/swelling, bleeding, severe pain patient will contact the office immediately.
Pain Refusal Text: I offered to prescribe pain medication but the patient refused to take this medication.
Mauc Instructions: By selecting yes to the question below the MAUC number will be added into the note.  This will be calculated automatically based on the diagnosis chosen, the size entered, the body zone selected (H,M,L) and the specific indications you chose. You will also have the option to override the Mohs AUC if you disagree with the automatically calculated number and this option is found in the Case Summary tab.
Where Do You Want The Question To Include Opioid Counseling Located?: Case Summary Tab
Eye Protection Verbiage: Before proceeding with the stage, a plastic scleral shield was inserted. The globe was anesthetized with a few drops of 1% lidocaine with 1:100,000 epinephrine. Then, an appropriate sized scleral shield was chosen and coated with lacrilube ointment. The shield was gently inserted and left in place for the duration of each stage. After the stage was completed, the shield was gently removed.
Mohs Method Verbiage: An incision at a 45 degree angle following the standard Mohs approach was done and the specimen was harvested as a microscopic controlled layer.  All histological findings, and if present, perineural invasion or presence of scar is noted on the Mohs map.
Surgeon/Pathologist Verbiage (Will Incorporate Name Of Surgeon From Intro If Not Blank): operated in two distinct and integrated capacities as the surgeon and pathologist.
Mohs Histo Method Verbiage: Each section was then chromacoded and processed in the Mohs lab using the Mohs protocol and submitted for tissue processing.
Subsequent Stages Histo Method Verbiage: Using a similar technique to that described above, a thin layer of tissue was removed from all areas where tumor was visible on the previous stage.  The tissue was again oriented, mapped, dyed, and processed as above.
Mohs Rapid Report Verbiage: The area of clinically evident tumor was marked with skin marking ink and appropriately hatched.  The initial incision was made following the Mohs approach through the skin.  The specimen was taken to the lab, divided into the necessary number of pieces, chromacoded and processed according to the Mohs protocol.  This was repeated in successive stages until a tumor free defect was achieved.
Complex Repair Preamble Text (Leave Blank If You Do Not Want): Extensive wide undermining was performed.
Intermediate Repair Preamble Text (Leave Blank If You Do Not Want): Undermining was performed with blunt dissection.
Graft Cartilage Fenestration Text: The cartilage was fenestrated with a 1 or 2mm punch biopsy to help facilitate graft survival and healing.
Non-Graft Cartilage Fenestration Text: The cartilage was fenestrated with a 1 or 2mm punch biopsy to help facilitate healing.
Secondary Intention Text (Leave Blank If You Do Not Want): We discussed various closure modalities with the patient, including healing by second intention, primary closure, skin graft, and various flaps.  After discussion of the risks and benefits of these various options, the decision was made to allow the wound to heal by second intention.
No Repair - Repaired With Adjacent Surgical Defect Text (Leave Blank If You Do Not Want): After obtaining clear surgical margins the defect was repaired concurrently with another surgical defect which was in close approximation.
Adjacent Tissue Transfer Text: The defect edges were debeveled with a #15 scalpel blade.  Given the location of the defect and the proximity to free margins an adjacent tissue transfer was deemed most appropriate.  Using a sterile surgical marker, an appropriate flap was drawn incorporating the defect and placing the expected incisions within the relaxed skin tension lines where possible.    The area thus outlined was incised deep to adipose tissue with a #15 scalpel blade.  The skin margins were undermined to an appropriate distance in all directions utilizing iris scissors.
Advancement Flap (Single) Text: We discussed various closure modalities with the patient, including healing by second intention, primary closure, skin graft and various flaps.  The location and configuration of the defect indicated that an advancement flap would result in the least disturbance of the position and function of the surrounding anatomic structures, and provide the best result.  The technique, its benefits, alternatives and risks were discussed with the patient.  The patient underwent the procedure as follows: The patient was positioned supine on the operating room table.  The area of the defect and the surrounding skin were anesthetized with buffered 1% lidocaine with epinephrine.  The area was washed with chlorhexidine.  Sterile drapes were applied. \\n\\nThe wound edges were debeveled and extensively undermined.  An advancement flap was designed, incised, and elevated.  Tissue was advanced and carried over to close the defect.  Hemostasis was achieved with spot electrodesiccation.  The flap was advanced into position to cover the primary defect and a key suture was used to secure the flap into place.  Additional buried interrupted sutures were used to close the arms of the flap by the rule of halves to share out the unequal lengths.  The standing cones so created by the design of the flap was removed to fat by triangulation.  Final cutaneous approximation was achieved.  The closure was manually tested and found to be sound for strength and hemostasis.
Advancement Flap (Double) Text: We discussed various closure modalities with the patient, including healing by second intention, primary closure, skin graft and various flaps.  The location and configuration of the defect indicated that a double advancement flap would result in the least disturbance of the position and function of the surrounding anatomic structures, and provide the best result.  The technique, its benefits, alternatives and risks were discussed with the patient.  The patient underwent the procedure as follows: The patient was positioned supine on the operating room table.  The area of the defect and the surrounding skin were anesthetized with buffered 1% lidocaine with epinephrine.  The area was washed with chlorhexidine.  Sterile drapes were applied. \\n\\nThe wound edges were debeveled and extensively undermined.  A double advancement flap was designed, incised, and elevated. Tissue was advanced and carried over to close the defect.  Hemostasis was achieved with spot electrodesiccation.  The flap was advanced into position to cover the primary defect and a key suture was used to secure the flap into place.  Additional buried interrupted sutures were used to close the arms of the flap by the rule of halves to share out the unequal lengths.  The standing cones so created by the design of the flap was removed to fat by triangulation.  Final cutaneous approximation was achieved.  The closure was manually tested and found to be sound for strength and hemostasis.
Advancement-Rotation Flap Text: The defect edges were debeveled with a #15 scalpel blade.  Given the location of the defect, shape of the defect and the proximity to free margins an advancement-rotation flap was deemed most appropriate.  Using a sterile surgical marker, an appropriate flap was drawn incorporating the defect and placing the expected incisions within the relaxed skin tension lines where possible. The area thus outlined was incised deep to adipose tissue with a #15 scalpel blade.  The skin margins were undermined to an appropriate distance in all directions utilizing iris scissors.
Alar Island Pedicle Flap Text: The defect edges were debeveled with a #15 scalpel blade.  Given the location of the defect, shape of the defect and the proximity to the alar rim an island pedicle advancement flap was deemed most appropriate.  Using a sterile surgical marker, an appropriate advancement flap was drawn incorporating the defect, outlining the appropriate donor tissue and placing the expected incisions within the nasal ala running parallel to the alar rim. The area thus outlined was incised with a #15 scalpel blade.  The skin margins were undermined minimally to an appropriate distance in all directions around the primary defect and laterally outward around the island pedicle utilizing iris scissors.  There was minimal undermining beneath the pedicle flap.
A-T Advancement Flap Text: The defect edges were debeveled with a #15 scalpel blade.  Given the location of the defect, shape of the defect and the proximity to free margins an A-T advancement flap was deemed most appropriate.  Using a sterile surgical marker, an appropriate advancement flap was drawn incorporating the defect and placing the expected incisions within the relaxed skin tension lines where possible.    The area thus outlined was incised deep to adipose tissue with a #15 scalpel blade.  The skin margins were undermined to an appropriate distance in all directions utilizing iris scissors.
Banner Transposition Flap Text: The defect edges were debeveled with a #15 scalpel blade.  Given the location of the defect and the proximity to free margins a Banner transposition flap was deemed most appropriate.  Using a sterile surgical marker, an appropriate flap drawn around the defect. The area thus outlined was incised deep to adipose tissue with a #15 scalpel blade.  The skin margins were undermined to an appropriate distance in all directions utilizing iris scissors.
Bilateral Helical Rim Advancement Flap Text: We discussed various closure modalities with the patient, including healing by second intention, primary closure, skin graft and various flaps.  The location and configuration of the defect indicated that a bilateral (double) helical rim advancement flap would result in the least disturbance of the position and function of the surrounding anatomic structures, and provide the best result.  The technique, its benefits, alternatives and risks were discussed with the patient.  The patient underwent the procedure as follows: The patient was positioned supine on the operating room table.  The area of the defect and the surrounding skin were anesthetized with buffered 1% lidocaine with epinephrine.  The area was washed with chlorhexidine.  Sterile drapes were applied. \\n\\nThe flap was designed, incised and elevated at the rim of the helix.  Hemostasis was achieved with spot electrodesiccation.  Tissue was carried over to close the defect.  The flap was advanced into position to cover the primary defect and a key suture was used to secure the flap into place.  Additional buried interrupted sutures were used to close the flap.  The standing cones so created by the design of the flap was removed to fat by triangulation.  Final cutaneous approximation was achieved.  The closure was manually tested and found to be sound for strength and hemostasis.
Bilateral Rotation Flap Text: The defect edges were debeveled with a #15 scalpel blade. Given the location of the defect, shape of the defect and the proximity to free margins a bilateral rotation flap was deemed most appropriate. Using a sterile surgical marker, an appropriate rotation flap was drawn incorporating the defect and placing the expected incisions within the relaxed skin tension lines where possible. The area thus outlined was incised deep to adipose tissue with a #15 scalpel blade. The skin margins were undermined to an appropriate distance in all directions utilizing iris scissors. Following this, the designed flap was rotated and carried over into the primary defect and sutured into place.
Bilobed Flap Text: We discussed various closure modalities with the patient, including healing by second intention, primary closure, skin graft and various flaps.  The location and configuration of the defect indicated that a bilobed transposition flap would result in the least disturbance of the position and function of the surrounding anatomic structures, and provide the best result.  The technique, its benefits, alternatives and risks were discussed with the patient.  The patient underwent the procedure as follows: The patient was positioned supine on the operating room table.  The area of the defect and the surrounding skin were anesthetized with buffered 1% lidocaine with epinephrine.  The area was washed with chlorhexidine.  Sterile drapes were applied. \\n\\nThe wound edges were debeveled and extensively undermined.  A bilobed transposition flap was designed, incised, and elevated.  Hemostasis was achieved with spot electrodesiccation.  The tertiary defect that was created in design of the flap was closed using buried interrupted sutures.  Tissue was carried over to close the defect.  The flap was then transposed to cover the primary defect, trimmed to more accurately fit the defect, and secured in position.  The standing cone so created was removed to fat by triangulation.  The 2nd lobe of the flap was trimmed to fit the secondary defect created in the flap design.  Additional buried interrupted sutures were used to achieve dermal wound apposition and secure the flap in place.  Final cutaneous approximation was achieved with running epidermal sutures.  The closure was manually tested and found to be sound for strength and hemostasis.
Bi-Rhombic Flap Text: We discussed various closure modalities with the patient, including healing by second intention, primary closure, skin graft and various flaps.  The location and configuration of the defect indicated that a double rhombic transposition flap would result in the least disturbance of the position and function of the surrounding anatomic structures, and provide the best result.  The technique, its benefits, alternatives and risks were discussed with the patient.  The patient underwent the procedure as follows: The patient was positioned supine on the operating room table.  The area of the defect and the surrounding skin were anesthetized with buffered 1% lidocaine with epinephrine.  The area was washed with chlorhexidine.  Sterile drapes were applied. \\n\\nThe wound edges were debeveled and extensively undermined.  A double rhombic transposition flap was designed, incised, and elevated.  Hemostasis was achieved with spot electrodesiccation.  The flaps were transposed into position to cover the primary defect and a key suture was used to secure the flap into place.  Additional buried interrupted sutures were used to close each flap.  The standing cones so created by the design of the flap was removed to fat by triangulation.  Final cutaneous approximation was achieved.  The closure was manually tested and found to be sound for strength and hemostasis.
Burow's Advancement Flap Text: We discussed various closure modalities with the patient, including healing by second intention, primary closure, skin graft and various flaps.  The location and configuration of the defect indicated that a Burow's advancement flap would result in the \\nleast disturbance of the position and function of the surrounding anatomic structures, and provide the best result.  The technique, its benefits, alternatives and risks were discussed with the patient.  The patient underwent the procedure as follows: The patient was positioned supine on the operating room table.  The area of the defect and the surrounding skin were anesthetized with buffered 1% lidocaine with epinephrine.  The area was washed with chlorhexidine.  Sterile drapes were applied. \\n\\nThe wound edges were debeveled and extensively undermined.  A Burow's advancement flap was designed, incised, and elevated. Tissue was carried over and advanced to close the defect. Hemostasis was achieved with spot electrodesiccation.  The flap was advanced into position to cover the primary defect and a key suture was used to secure the flap into place.  Additional buried interrupted sutures were used to close the arms of the flap by the rule of halves to share out the unequal lengths.  The standing cones so created by the design of the flap was removed to fat by triangulation.  Final cutaneous approximation was achieved.  The closure was manually tested and found to be sound for strength and hemostasis.
Chonodrocutaneous Helical Advancement Flap Text: The defect edges were debeveled with a #15 scalpel blade.  Given the location of the defect and the proximity to free margins a chondrocutaneous helical advancement flap was deemed most appropriate.  Using a sterile surgical marker, the appropriate advancement flap was drawn incorporating the defect and placing the expected incisions within the relaxed skin tension lines where possible.    The area thus outlined was incised deep to adipose tissue with a #15 scalpel blade.  The skin margins were undermined to an appropriate distance in all directions utilizing iris scissors.
Crescentic Advancement Flap Text: The defect edges were debeveled with a #15 scalpel blade.  Given the location of the defect and the proximity to free margins a crescentic advancement flap was deemed most appropriate.  Using a sterile surgical marker, the appropriate advancement flap was drawn incorporating the defect and placing the expected incisions within the relaxed skin tension lines where possible.    The area thus outlined was incised deep to adipose tissue with a #15 scalpel blade.  The skin margins were undermined to an appropriate distance in all directions utilizing iris scissors.
Dorsal Nasal Flap Text: The defect edges were debeveled with a #15 scalpel blade.  Given the location of the defect and the proximity to free margins a dorsal nasal flap was deemed most appropriate.  Using a sterile surgical marker, an appropriate dorsal nasal flap was drawn around the defect.    The area thus outlined was incised deep to adipose tissue with a #15 scalpel blade.  The skin margins were undermined to an appropriate distance in all directions utilizing iris scissors.
Double Island Pedicle Flap Text: We discussed various closure modalities with the patient, including healing by second intention, primary closure, skin graft and various flaps.  The location and configuration of the defect indicated that a double or bilateral island pedicle flap would result in the least disturbance of the position and function of the surrounding anatomic structures, and provide the best result.  The technique, its benefits, alternatives and risks were discussed with the patient.  The patient underwent the procedure as follows: The patient was positioned supine on the operating room table.  The area of the defect and the surrounding skin were anesthetized with buffered 1% lidocaine with epinephrine.  The area was washed with chlorhexidine.  Sterile drapes were applied. \\n\\nThe wound edges were debeveled and extensively undermined.    \\nA double island pedicle flap was designed and incised down to subcutaneous fat, severing the flap entirely from surrounding epidermal and dermal attachments.  Careful deep undermining was performed to create a single subcutaneous pedicle on either side of the defect.  The deepest portion of each subcutaneous pedicle was angled toward the primary defect in order to create hinge-mobility for advancement of the flap.  Extreme care was taken to preserve a portion of the axial plexus to optimize adequate vascular supply to the flap.  Hemostasis was achieved with spot electrodesiccation.  The double island pedicle flaps were advanced into position to cover the primary defect and a key suture was placed to close the secondary defect.  The flap was carried over to close the defect.  The flap was trimmed to fit the contour of the primary defect.  Additional buried interrupted sutures were used to secure the flap into place and close the secondary defect created by the flap advancement.  Final cutaneous approximation was achieved.  The closure was manually tested and found to be sound for strength and hemostasis.
Double O-Z Flap Text: The defect edges were debeveled with a #15 scalpel blade.  Given the location of the defect, shape of the defect and the proximity to free margins a Double O-Z flap was deemed most appropriate.  Using a sterile surgical marker, an appropriate transposition flap was drawn incorporating the defect and placing the expected incisions within the relaxed skin tension lines where possible. The area thus outlined was incised deep to adipose tissue with a #15 scalpel blade.  The skin margins were undermined to an appropriate distance in all directions utilizing iris scissors.
Double O-Z Plasty Text: We discussed various closure modalities with the patient, including healing by second intention, primary closure, skin graft and various flaps.  The location and configuration of the defect indicated that a double O-Z rotation flap would result in the least disturbance of the position and function of the surrounding anatomic structures, and provide the best result.  The technique, its benefits, alternatives and risks were discussed with the patient.  The patient underwent the procedure as follows: The patient was positioned supine on the operating room table.  The area of the defect and the surrounding skin were anesthetized with buffered 1% lidocaine with epinephrine.  The area was washed with chlorhexidine.  Sterile drapes were applied. \\n\\nThe defect edges were debeveled with a #15 scalpel blade.  Given the location of the defect, shape of the defect and the proximity to free margins a Double O-Z plasty (double transposition flap) was deemed most appropriate.  Using a sterile surgical marker, the appropriate transposition flaps were drawn incorporating the defect and placing the expected incisions within the relaxed skin tension lines where possible. The area thus outlined was incised deep to adipose tissue with a #15 scalpel blade.  The skin margins were undermined to an appropriate distance in all directions utilizing iris scissors.  Hemostasis was achieved with electrocautery.  The flaps were then transposed into place, one clockwise and the other counterclockwise, and anchored with interrupted buried subcutaneous sutures.
Double Z Plasty Text: The lesion was extirpated to the level of the fat with a #15 scalpel blade. Given the location of the defect, shape of the defect and the proximity to free margins a double Z-plasty was deemed most appropriate for repair. Using a sterile surgical marker, the appropriate transposition arms of the double Z-plasty were drawn incorporating the defect and placing the expected incisions within the relaxed skin tension lines where possible. The area thus outlined was incised deep to adipose tissue with a #15 scalpel blade. The skin margins were undermined to an appropriate distance in all directions utilizing iris scissors. The opposing transposition arms were then transposed and carried over into place in opposite direction and anchored with interrupted buried subcutaneous sutures.
Ear Star Wedge Flap Text: The defect edges were debeveled with a #15 blade scalpel.  Given the location of the defect and the proximity to free margins (helical rim) an ear star wedge flap was deemed most appropriate.  Using a sterile surgical marker, the appropriate flap was drawn incorporating the defect and placing the expected incisions between the helical rim and antihelix where possible.  The area thus outlined was incised through and through with a #15 scalpel blade.
Flip-Flop Flap Text: The defect edges were debeveled with a #15 blade scalpel.  Given the location of the defect and the proximity to free margins a flip-flop flap was deemed most appropriate. Using a sterile surgical marker, the appropriate flap was drawn incorporating the defect and placing the expected incisions between the helical rim and antihelix where possible.  The area thus outlined was incised through and through with a #15 scalpel blade. Following this, the designed flap was carried over into the primary defect and sutured into place.
Hatchet Flap Text: The defect edges were debeveled with a #15 scalpel blade.  Given the location of the defect, shape of the defect and the proximity to free margins a hatchet flap was deemed most appropriate.  Using a sterile surgical marker, an appropriate hatchet flap was drawn incorporating the defect and placing the expected incisions within the relaxed skin tension lines where possible.    The area thus outlined was incised deep to adipose tissue with a #15 scalpel blade.  The skin margins were undermined to an appropriate distance in all directions utilizing iris scissors.
Helical Rim Advancement Flap Text: We discussed various closure modalities with the patient, including healing by second intention, primary closure, skin graft and various flaps.  The location and configuration of the defect indicated that a helical rim advancement flap would result in the least disturbance of the position and function of the surrounding anatomic structures, and provide the best result.  The technique, its benefits, alternatives and risks were discussed with the patient.  The patient underwent the procedure as follows: The patient was positioned supine on the operating room table.  The area of the defect and the surrounding skin were anesthetized with buffered 1% lidocaine with epinephrine.  The area was washed with chlorhexidine.  Sterile drapes were applied. \\n\\nThe flap was designed, incised and elevated at the rim of the helix.  Hemostasis was achieved with spot electrodesiccation.  The flap was advanced into position to cover the primary defect and a key suture was used to secure the flap into place.  Tissue was carried over to close the defect.  Additional buried interrupted sutures were used to close the flap.  The standing cones so created by the design of the flap was removed to fat by triangulation.  Final cutaneous approximation was achieved.  The closure was manually tested and found to be sound for strength and hemostasis.
H Plasty Text: Given the location of the defect, shape of the defect and the proximity to free margins a H-plasty was deemed most appropriate for repair.  Using a sterile surgical marker, the appropriate advancement arms of the H-plasty were drawn incorporating the defect and placing the expected incisions within the relaxed skin tension lines where possible. The area thus outlined was incised deep to adipose tissue with a #15 scalpel blade. The skin margins were undermined to an appropriate distance in all directions utilizing iris scissors.  The opposing advancement arms were then advanced into place in opposite direction and anchored with interrupted buried subcutaneous sutures.
Island Pedicle Flap Text: We discussed various closure modalities with the patient, including healing by second intention, primary closure, skin graft and various flaps.  The location and configuration of the defect indicated that a island pedicle flap would result in the least disturbance of the position and function of the surrounding anatomic structures, and provide the best result.  The technique, its benefits, alternatives and risks were discussed with the patient.  The patient underwent the procedure as follows: The patient was positioned supine on the operating room table.  The area of the defect and the surrounding skin were anesthetized with buffered 1% lidocaine with epinephrine.  The area was washed with chlorhexidine.  Sterile drapes were applied. \\n\\nThe wound edges were debeveled and extensively undermined.    \\nAn island pedicle flap was designed and incised down to subcutaneous fat, severing the flap entirely from surrounding epidermal and dermal attachments.  Careful deep undermining was performed to create a single subcutaneous pedicle.  The deepest portion of the subcutaneous pedicle was angled toward the primary defect in order to create hinge-mobility for advancement of the flap.  Extreme care was taken to preserve a portion of the axial plexus to optimize adequate vascular supply to the flap.  Hemostasis was achieved with spot electrodesiccation.  The island pedicle flap was advanced into position to cover the primary defect and a key suture was placed to close the secondary defect.  The flap was carried over to close the defect.  The flap was trimmed to fit the contour of the primary defect.  Additional buried interrupted sutures were used to secure the flap into place and close the secondary defect created by the flap advancement.  Final cutaneous approximation was achieved.  The closure was manually tested and found to be sound for strength and hemostasis.
Island Pedicle Flap With Canthal Suspension Text: The defect edges were debeveled with a #15 scalpel blade.  Given the location of the defect, shape of the defect and the proximity to free margins an island pedicle advancement flap was deemed most appropriate.  Using a sterile surgical marker, an appropriate advancement flap was drawn incorporating the defect, outlining the appropriate donor tissue and placing the expected incisions within the relaxed skin tension lines where possible. The area thus outlined was incised deep to adipose tissue with a #15 scalpel blade.  The skin margins were undermined to an appropriate distance in all directions around the primary defect and laterally outward around the island pedicle utilizing iris scissors.  There was minimal undermining beneath the pedicle flap. A suspension suture was placed in the canthal tendon to prevent tension and prevent ectropion.
Island Pedicle Flap-Requiring Vessel Identification Text: The defect edges were debeveled with a #15 scalpel blade.  Given the location of the defect, shape of the defect and the proximity to free margins an island pedicle advancement flap was deemed most appropriate.  Using a sterile surgical marker, an appropriate advancement flap was drawn, based on the axial vessel mentioned above, incorporating the defect, outlining the appropriate donor tissue and placing the expected incisions within the relaxed skin tension lines where possible.    The area thus outlined was incised deep to adipose tissue with a #15 scalpel blade.  The skin margins were undermined to an appropriate distance in all directions around the primary defect and laterally outward around the island pedicle utilizing iris scissors.  There was minimal undermining beneath the pedicle flap.
Keystone Flap Text: We discussed various closure modalities with the patient, including healing by second intention, primary closure, skin graft and various flaps.  The location and configuration of the defect indicated that a Keystone flap would result in the least disturbance of the position and function of the surrounding anatomic structures, and provide the best result.  The technique, its benefits, alternatives and risks were discussed with the patient.  The patient underwent the procedure as follows: The patient was positioned supine on the operating room table.  The area of the defect and the surrounding skin were anesthetized with buffered 1% lidocaine with epinephrine.  The area was washed with chlorhexidine.  Sterile drapes were applied. \\n\\nThe defect edges were debeveled with a #15 scalpel blade.  Given the location of the defect, shape of the defect a keystone flap was deemed most appropriate.  Using a sterile surgical marker, an appropriate keystone flap was drawn incorporating the defect, outlining the appropriate donor tissue and placing the expected incisions within the relaxed skin tension lines where possible. The area thus outlined was incised deep to adipose tissue with a #15 scalpel blade.  The skin margins were undermined to an appropriate distance in all directions around the primary defect and laterally outward around the flap utilizing iris scissors.  The flap was carried over to close the defect.
Melolabial Transposition Flap Text: We discussed various closure modalities with the patient, including healing by second intention, primary closure, skin graft and various flaps.  The location and configuration of the defect indicated that Cheek to nose (Melolabial) Transposition flap would result in the least disturbance of the position and function of the surrounding anatomic structures, and provide the best result.  The technique, its benefits, alternatives and risks were discussed with the patient.  The patient underwent the procedure as follows: The patient was positioned supine on the operating room table.  The area of the defect and the surrounding skin were anesthetized with buffered 1% lidocaine with epinephrine.  The area was washed with chlorhexidine.  Sterile drapes were applied. \\n\\nThe wound edges were debeveled and extensively undermined.  Melolabbial transposition flap was designed, incised, and elevated.  Hemostasis was achieved with spot electrodesiccation.  The flap was transposed into position to cover the primary defect and a key suture was used to secure the flap into place.  Tissue was carried over to close the defect.  Additional buried interrupted sutures were used to close the flap.  The standing cones so created by the design of the flap was removed to fat by triangulation.  Final cutaneous approximation was achieved.  The closure was manually tested and found to be sound for strength and hemostasis.\\n\\nThe defect edges were debeveled with a #15 scalpel blade.  Given the location of the defect and the proximity to free margins a melolabial flap was deemed most appropriate.  Using a sterile surgical marker, an appropriate melolabial transposition flap was drawn incorporating the defect.    The area thus outlined was incised deep to adipose tissue with a #15 scalpel blade.  The skin margins were undermined to an appropriate distance in all directions utilizing iris scissors.
Mercedes Flap Text: We discussed various closure modalities with the patient, including healing by second intention, primary closure, skin graft and various flaps.  The location and configuration of the defect indicated that a three-point advancement flap (Mercedes) would result in the least disturbance of the position and function of the surrounding anatomic structures, and provide the best result.  The technique, its benefits, alternatives and risks were discussed with the patient.  The patient underwent the procedure as follows: The patient was positioned supine on the operating room table.  The area of the defect and the surrounding skin were anesthetized with buffered 1% lidocaine with epinephrine.  The area was washed with chlorhexidine.  Sterile drapes were applied. \\n\\nThe wound edges were debeveled and extensively undermined.  A three point advancement flap was designed, incised, and elevated.  Hemostasis was achieved with spot electrodesiccation.  Each point of the flap was advanced into position to cover the primary defect and a key suture was used to secure the flap into place.  Tissue was carried over to close the defect.  Additional buried interrupted sutures were used to close the arms of the flap by the rule of halves to share out the unequal lengths.  The standing cones so created by the design of the flap was removed to fat by triangulation.  Final cutaneous approximation was achieved.  The closure was manually tested and found to be sound for strength and hemostasis.
Modified Advancement Flap Text: The defect edges were debeveled with a #15 scalpel blade.  Given the location of the defect, shape of the defect and the proximity to free margins a modified advancement flap was deemed most appropriate.  Using a sterile surgical marker, an appropriate advancement flap was drawn incorporating the defect and placing the expected incisions within the relaxed skin tension lines where possible.    The area thus outlined was incised deep to adipose tissue with a #15 scalpel blade.  The skin margins were undermined to an appropriate distance in all directions utilizing iris scissors.
Mucosal Advancement Flap Text: We discussed various closure modalities with the patient, including healing by second intention, primary closure, skin graft and various flaps.  The location and configuration of the defect indicated that a mucosal advancement flap would result in the least disturbance of the position and function of the surrounding anatomic structures, and provide the best result.  The technique, its benefits, alternatives and risks were discussed with the patient.  The patient underwent the procedure as follows: The patient was positioned supine on the operating room table.  The area of the defect and the surrounding skin were anesthetized with buffered 1% lidocaine with epinephrine.  The area was washed with chlorhexidine.  Sterile drapes were applied. \\n\\nThe wound edges were debeveled and extensively undermined.  A mucosal flap was designed, incised, and elevated.  Hemostasis was achieved with spot electrodesiccation.  The flap was advanced into position to cover the primary defect and a key suture was used to secure the flap into place.  Additional buried interrupted sutures were used to close the remainder of the flap.  The standing cones so created by the design of the flap was removed to fat by triangulation.  Final cutaneous approximation was achieved.  The closure was manually tested and found to be sound for strength and hemostasis.
Muscle Hinge Flap Text: The defect edges were debeveled with a #15 scalpel blade.  Given the size, depth and location of the defect and the proximity to free margins a muscle hinge flap was deemed most appropriate.  Using a sterile surgical marker, an appropriate hinge flap was drawn incorporating the defect. The area thus outlined was incised with a #15 scalpel blade.  The skin margins were undermined to an appropriate distance in all directions utilizing iris scissors.
Mustarde Flap Text: The defect edges were debeveled with a #15 scalpel blade.  Given the size, depth and location of the defect and the proximity to free margins a Mustarde flap was deemed most appropriate.  Using a sterile surgical marker, an appropriate flap was drawn incorporating the defect. The area thus outlined was incised with a #15 scalpel blade.  The skin margins were undermined to an appropriate distance in all directions utilizing iris scissors.
Nasal Turnover Hinge Flap Text: The defect edges were debeveled with a #15 scalpel blade.  Given the size, depth, location of the defect and the defect being full thickness a nasal turnover hinge flap was deemed most appropriate.  Using a sterile surgical marker, an appropriate hinge flap was drawn incorporating the defect. The area thus outlined was incised with a #15 scalpel blade. The flap was designed to recreate the nasal mucosal lining and the alar rim. The skin margins were undermined to an appropriate distance in all directions utilizing iris scissors.
Nasalis-Muscle-Based Myocutaneous Island Pedicle Flap Text: Using a #15 blade, an incision was made around the donor flap to the level of the nasalis muscle. Wide lateral undermining was then performed in both the subcutaneous plane above the nasalis muscle, and in a submuscular plane just above periosteum. This allowed the formation of a free nasalis muscle axial pedicle (based on the angular artery) which was still attached to the actual cutaneous flap, increasing its mobility and vascular viability. Hemostasis was obtained with pinpoint electrocoagulation. The flap was mobilized and tunnelled into position and the pivotal anchor points positioned and stabilized with buried interrupted sutures. Subcutaneous and dermal tissues were closed in a multilayered fashion with sutures. Tissue redundancies were excised, and the epidermal edges were apposed without significant tension and sutured with sutures.  The primary and secondary sites were closed with subcutaneous, dermal and epidermal sutures.
Nasalis Myocutaneous Flap Text: Using a #15 blade, an incision was made around the donor flap to the level of the nasalis muscle. Wide lateral undermining was then performed in both the subcutaneous plane above the nasalis muscle, and in a submuscular plane just above periosteum. This allowed the formation of a free nasalis muscle axial pedicle which was still attached to the actual cutaneous flap, increasing its mobility and vascular viability. Hemostasis was obtained with pinpoint electrocoagulation. The flap was mobilized into position and the pivotal anchor points positioned and stabilized with buried interrupted sutures. Subcutaneous and dermal tissues were closed in a multilayered fashion with sutures. Tissue redundancies were excised, and the epidermal edges were apposed without significant tension and sutured with sutures.
Nasolabial Transposition Flap Text: The defect edges were debeveled with a #15 scalpel blade.  Given the size, depth and location of the defect and the proximity to free margins a nasolabial transposition flap was deemed most appropriate. Using a sterile surgical marker, an appropriate flap was drawn incorporating the defect. The area thus outlined was incised with a #15 scalpel blade. The skin margins were undermined to an appropriate distance in all directions utilizing iris scissors. Following this, the designed flap was carried into the primary defect and sutured into place.
Orbicularis Oris Muscle Flap Text: The defect edges were debeveled with a #15 scalpel blade.  Given that the defect affected the competency of the oral sphincter an obicularis oris muscle flap was deemed most appropriate to restore this competency and normal muscle function.  Using a sterile surgical marker, an appropriate flap was drawn incorporating the defect. The area thus outlined was incised with a #15 scalpel blade.
O-T Advancement Flap Text: We discussed various closure modalities with the patient, including healing by second intention, primary closure, skin graft and various flaps.  The location and configuration of the defect indicated that an O-T advancement flap would result in the least disturbance of the position and function of the surrounding anatomic structures, and provide the best result.  The technique, its benefits, alternatives and risks were discussed with the patient.  The patient underwent the procedure as follows: The patient was positioned supine on the operating room table.  The area of the defect and the surrounding skin were anesthetized with buffered 1% lidocaine with epinephrine.  The area was washed with chlorhexidine.  Sterile drapes were applied. \\n\\nThe wound edges were debeveled and extensively undermined.  A O-T advancement flap was designed, incised, and elevated.  Tissue was advanced and carried over to close the defect. Hemostasis was achieved with spot electrodesiccation.  The flap was advanced into position to cover the primary defect and a key suture was used to secure the flap into place.  Additional buried interrupted sutures were used to close the arms of the flap by the rule of halves to share out the unequal lengths.  The standing cones so created by the design of the flap was removed to fat by triangulation.  Final cutaneous approximation was achieved.  The closure was manually tested and found to be sound for strength and hemostasis.
O-T Plasty Text: The defect edges were debeveled with a #15 scalpel blade.  Given the location of the defect, shape of the defect and the proximity to free margins an O-T plasty was deemed most appropriate.  Using a sterile surgical marker, an appropriate O-T plasty was drawn incorporating the defect and placing the expected incisions within the relaxed skin tension lines where possible.    The area thus outlined was incised deep to adipose tissue with a #15 scalpel blade.  The skin margins were undermined to an appropriate distance in all directions utilizing iris scissors.
O-L Flap Text: We discussed various closure modalities with the patient, including healing by second intention, primary closure, skin graft and various flaps.  The location and configuration of the defect indicated that an O-L advancement flap would result in the least disturbance of the position and function of the surrounding anatomic structures, and provide the best result.  The technique, its benefits, alternatives and risks were discussed with the patient.  The patient underwent the procedure as follows: The patient was positioned supine on the operating room table.  The area of the defect and the surrounding skin were anesthetized with buffered 1% lidocaine with epinephrine.  The area was washed with chlorhexidine.  Sterile drapes were applied. \\n\\nThe wound edges were debeveled and extensively undermined.  A O-L advancement flap was designed, incised, and elevated.  Tissue was advanced and carried over to close the defect. Hemostasis was achieved with spot electrodesiccation.  The flap was advanced into position to cover the primary defect and a key suture was used to secure the flap into place.  Additional buried interrupted sutures were used to close the arms of the flap by the rule of halves to share out the unequal lengths.  The standing cones so created by the design of the flap was removed to fat by triangulation.  Final cutaneous approximation was achieved.  The closure was manually tested and found to be sound for strength and hemostasis.
O-Z Flap Text: The defect edges were debeveled with a #15 scalpel blade.  Given the location of the defect, shape of the defect and the proximity to free margins an O-Z flap was deemed most appropriate.  Using a sterile surgical marker, an appropriate transposition flap was drawn incorporating the defect and placing the expected incisions within the relaxed skin tension lines where possible. The area thus outlined was incised deep to adipose tissue with a #15 scalpel blade.  The skin margins were undermined to an appropriate distance in all directions utilizing iris scissors.
O-Z Plasty Text: The defect edges were debeveled with a #15 scalpel blade.  Given the location of the defect, shape of the defect and the proximity to free margins an O-Z plasty (double transposition flap) was deemed most appropriate.  Using a sterile surgical marker, the appropriate transposition flaps were drawn incorporating the defect and placing the expected incisions within the relaxed skin tension lines where possible.    The area thus outlined was incised deep to adipose tissue with a #15 scalpel blade.  The skin margins were undermined to an appropriate distance in all directions utilizing iris scissors.  Hemostasis was achieved with electrocautery.  The flaps were then transposed into place, one clockwise and the other counterclockwise, and anchored with interrupted buried subcutaneous sutures.
Peng Advancement Flap Text: The defect edges were debeveled with a #15 scalpel blade.  Given the location of the defect, shape of the defect and the proximity to free margins a Peng advancement flap was deemed most appropriate.  Using a sterile surgical marker, an appropriate advancement flap was drawn incorporating the defect and placing the expected incisions within the relaxed skin tension lines where possible. The area thus outlined was incised deep to adipose tissue with a #15 scalpel blade.  The skin margins were undermined to an appropriate distance in all directions utilizing iris scissors.
Rectangular Flap Text: The defect edges were debeveled with a #15 scalpel blade. Given the location of the defect and the proximity to free margins a rectangular flap was deemed most appropriate. Using a sterile surgical marker, an appropriate rectangular flap was drawn incorporating the defect. The area thus outlined was incised deep to adipose tissue with a #15 scalpel blade. The skin margins were undermined to an appropriate distance in all directions utilizing iris scissors. Following this, the designed flap was carried over into the primary defect and sutured into place.
Rhombic Flap Text: We discussed various closure modalities with the patient, including healing by second intention, primary closure, skin graft and various flaps.  The location and configuration of the defect indicated that a rhombic transposition flap would result in the least disturbance of the position and function of the surrounding anatomic structures, and provide the best result.  The technique, its benefits, alternatives and risks were discussed with the patient.  The patient underwent the procedure as follows: The patient was positioned supine on the operating room table.  The area of the defect and the surrounding skin were anesthetized with buffered 1% lidocaine with epinephrine.  The area was washed with chlorhexidine.  Sterile drapes were applied. \\n\\nThe wound edges were debeveled and extensively undermined.  A rhombic transposition flap was designed, incised, and elevated.  Hemostasis was achieved with spot electrodesiccation.  The flap was transposed into position to cover the primary defect and a key suture was used to secure the flap into place.  Tissue was carried over to close the defect.  Additional buried interrupted sutures were used to close the flap.  The standing cones so created by the design of the flap was removed to fat by triangulation.  Final cutaneous approximation was achieved.  The closure was manually tested and found to be sound for strength and hemostasis.
Rhomboid Transposition Flap Text: The defect edges were debeveled with a #15 scalpel blade.  Given the location of the defect and the proximity to free margins a rhomboid transposition flap was deemed most appropriate.  Using a sterile surgical marker, an appropriate rhomboid flap was drawn incorporating the defect.    The area thus outlined was incised deep to adipose tissue with a #15 scalpel blade.  The skin margins were undermined to an appropriate distance in all directions utilizing iris scissors.
Rotation Flap Text: We discussed various closure modalities with the patient, including healing by second intention, primary closure, skin graft and various flaps.  The location and configuration of the defect indicated that an rotation flap would result in the least disturbance of the position and function of the surrounding anatomic structures, and provide the best result.  The technique, its benefits, alternatives and risks were discussed with the patient.  The patient underwent the procedure as follows: The patient was positioned supine on the operating room table.  The area of the defect and the surrounding skin were anesthetized with buffered 1% lidocaine with epinephrine.  The area was washed with chlorhexidine.  Sterile drapes were applied. \\n\\nThe wound edges were debeveled and extensively undermined.  A rotation flap was designed, incised, and elevated.  Hemostasis was achieved with spot electrodesiccation.  The flap was rotated into position to cover the primary defect and a key suture was used to secure the flap into place.  Tissue was carried over to close the defect.  Additional buried interrupted sutures were used to close the arms of the flap by the rule of halves to share out the unequal lengths.  The standing cones so created by the design of the flap was removed to fat by triangulation.  Final cutaneous approximation was achieved.  The closure was manually tested and found to be sound for strength and hemostasis.
Spiral Flap Text: The defect edges were debeveled with a #15 scalpel blade.  Given the location of the defect, shape of the defect and the proximity to free margins a spiral flap was deemed most appropriate.  Using a sterile surgical marker, an appropriate rotation flap was drawn incorporating the defect and placing the expected incisions within the relaxed skin tension lines where possible. The area thus outlined was incised deep to adipose tissue with a #15 scalpel blade.  The skin margins were undermined to an appropriate distance in all directions utilizing iris scissors.
Staged Advancement Flap Text: The defect edges were debeveled with a #15 scalpel blade.  Given the location of the defect, shape of the defect and the proximity to free margins a staged advancement flap was deemed most appropriate.  Using a sterile surgical marker, an appropriate advancement flap was drawn incorporating the defect and placing the expected incisions within the relaxed skin tension lines where possible. The area thus outlined was incised deep to adipose tissue with a #15 scalpel blade.  The skin margins were undermined to an appropriate distance in all directions utilizing iris scissors.
Star Wedge Flap Text: The defect edges were debeveled with a #15 scalpel blade.  Given the location of the defect, shape of the defect and the proximity to free margins a star wedge flap was deemed most appropriate.  Using a sterile surgical marker, an appropriate rotation flap was drawn incorporating the defect and placing the expected incisions within the relaxed skin tension lines where possible. The area thus outlined was incised deep to adipose tissue with a #15 scalpel blade.  The skin margins were undermined to an appropriate distance in all directions utilizing iris scissors.
Transposition Flap Text: We discussed various closure modalities with the patient, including healing by second intention, primary closure, skin graft and various flaps.  The location and configuration of the defect indicated that an transposition flap would result in the least disturbance of the position and function of the surrounding anatomic structures, and provide the best result.  The technique, its benefits, alternatives and risks were discussed with the patient.  The patient underwent the procedure as follows: The patient was positioned supine on the operating room table.  The area of the defect and the surrounding skin were anesthetized with buffered 1% lidocaine with epinephrine.  The area was washed with chlorhexidine.  Sterile drapes were applied. \\n\\nThe wound edges were debeveled and extensively undermined.  A transposition flap was designed, incised, and elevated.  Hemostasis was achieved with spot electrodesiccation.  The flap was transposed into position to cover the primary defect and a key suture was used to secure the flap into place.  Tissue was carried over to close the defect.  Additional buried interrupted sutures were used to close the flap.  The standing cones so created by the design of the flap was removed to fat by triangulation.  Final cutaneous approximation was achieved.  The closure was manually tested and found to be sound for strength and hemostasis.
Trilobed Flap Text: We discussed various closure modalities with the patient, including healing by second intention, primary closure, skin graft and various flaps.  The location and configuration of the defect indicated that a trilobed transposition flap would result in the least disturbance of the position and function of the surrounding anatomic structures, and provide the best result.  The technique, its benefits, alternatives and risks were discussed with the patient.  The patient underwent the procedure as follows: The patient was positioned supine on the operating room table.  The area of the defect and the surrounding skin were anesthetized with buffered 1% lidocaine with epinephrine.  The area was washed with chlorhexidine.  Sterile drapes were applied. \\n\\nThe wound edges were debeveled and extensively undermined.  A trilobed transposition flap was designed, incised, and elevated.  Hemostasis was achieved with spot electrodesiccation.  The quaterinary and tertiary defects created in design of the flap were closed using buried interrupted sutures.  The flap was then transposed to cover the primary defect, trimmed to more accurately fit the defect, and secured in position.  Tissue was carried over to close the defect. The standing cone so created was removed to fat by triangulation.  The 2nd lobe of the flap was trimmed to fit the secondary defect created in the flap design.  Additional buried interrupted sutures were used to achieve dermal wound apposition and secure the flap in place.  Final cutaneous approximation was achieved with running epidermal sutures.  The closure was manually tested and found to be sound for strength and hemostasis.
V-Y Flap Text: The defect edges were debeveled with a #15 scalpel blade.  Given the location of the defect, shape of the defect and the proximity to free margins a V-Y flap was deemed most appropriate.  Using a sterile surgical marker, an appropriate advancement flap was drawn incorporating the defect and placing the expected incisions within the relaxed skin tension lines where possible.    The area thus outlined was incised deep to adipose tissue with a #15 scalpel blade.  The skin margins were undermined to an appropriate distance in all directions utilizing iris scissors.
V-Y Plasty Text: The defect edges were debeveled with a #15 scalpel blade.  Given the location of the defect, shape of the defect and the proximity to free margins an V-Y advancement flap was deemed most appropriate.  Using a sterile surgical marker, an appropriate advancement flap was drawn incorporating the defect and placing the expected incisions within the relaxed skin tension lines where possible.    The area thus outlined was incised deep to adipose tissue with a #15 scalpel blade.  The skin margins were undermined to an appropriate distance in all directions utilizing iris scissors.
W Plasty Text: The lesion was extirpated to the level of the fat with a #15 scalpel blade.  Given the location of the defect, shape of the defect and the proximity to free margins a W-plasty was deemed most appropriate for repair.  Using a sterile surgical marker, the appropriate transposition arms of the W-plasty were drawn incorporating the defect and placing the expected incisions within the relaxed skin tension lines where possible.    The area thus outlined was incised deep to adipose tissue with a #15 scalpel blade.  The skin margins were undermined to an appropriate distance in all directions utilizing iris scissors.  The opposing transposition arms were then transposed into place in opposite direction and anchored with interrupted buried subcutaneous sutures.
Z Plasty Text: The lesion was extirpated to the level of the fat with a #15 scalpel blade.  Given the location of the defect, shape of the defect and the proximity to free margins a Z-plasty was deemed most appropriate for repair.  Using a sterile surgical marker, the appropriate transposition arms of the Z-plasty were drawn incorporating the defect and placing the expected incisions within the relaxed skin tension lines where possible.    The area thus outlined was incised deep to adipose tissue with a #15 scalpel blade.  The skin margins were undermined to an appropriate distance in all directions utilizing iris scissors.  The opposing transposition arms were then transposed into place in opposite direction and anchored with interrupted buried subcutaneous sutures.
Zygomaticofacial Flap Text: Given the location of the defect, shape of the defect and the proximity to free margins a zygomaticofacial flap was deemed most appropriate for repair.  Using a sterile surgical marker, the appropriate flap was drawn incorporating the defect and placing the expected incisions within the relaxed skin tension lines where possible. The area thus outlined was incised deep to adipose tissue with a #15 scalpel blade with preservation of a vascular pedicle.  The skin margins were undermined to an appropriate distance in all directions utilizing iris scissors.  The flap was then placed into the defect and anchored with interrupted buried subcutaneous sutures.
Abbe Flap (Lower To Upper Lip) Text: The defect of the upper lip was assessed and measured.  Given the location and size of the defect, an Abbe flap was deemed most appropriate.  Using a sterile surgical marker, an appropriate Abbe flap was measured and drawn on the lower lip. Local anesthesia was then infiltrated. A scalpel was then used to incise the upper lip through and through the skin, vermilion, muscle and mucosa, leaving the flap pedicled on the opposite side.  The flap was then rotated and transferred to the lower lip defect.  The flap was then sutured into place with a three layer technique, closing the orbicularis oris muscle layer with subcutaneous buried sutures, followed by a mucosal layer and an epidermal layer.
Abbe Flap (Upper To Lower Lip) Text: The defect of the lower lip was assessed and measured.  Given the location and size of the defect, an Abbe flap was deemed most appropriate.  Using a sterile surgical marker, an appropriate Abbe flap was measured and drawn on the upper lip. Local anesthesia was then infiltrated.  A scalpel was then used to incise the upper lip through and through the skin, vermilion, muscle and mucosa, leaving the flap pedicled on the opposite side.  The flap was then rotated and transferred to the lower lip defect.  The flap was then sutured into place with a three layer technique, closing the orbicularis oris muscle layer with subcutaneous buried sutures, followed by a mucosal layer and an epidermal layer.
Cheek Interpolation Flap Text: A decision was made to reconstruct the defect utilizing an interpolation axial flap and a staged reconstruction.  A telfa template was made of the defect.  This telfa template was then used to outline the Cheek Interpolation flap.  The donor area for the pedicle flap was then injected with anesthesia.  The flap was excised through the skin and subcutaneous tissue down to the layer of the underlying musculature.  The interpolation flap was carefully excised within this deep plane to maintain its blood supply.  The edges of the donor site were undermined.   The donor site was closed in a primary fashion.  The pedicle was then rotated into position and sutured.  Once the tube was sutured into place, adequate blood supply was confirmed with blanching and refill.  The pedicle was then wrapped with xeroform gauze and dressed appropriately with a telfa and gauze bandage to ensure continued blood supply and protect the attached pedicle.
Cheek-To-Nose Interpolation Flap Text: We discussed various closure modalities with the patient, including healing by second intention, primary closure, skin graft and various flaps.  The location and configuration of the defect indicated that a cheek to nose interpolation flap would result in the least disturbance of the position and function of the surrounding anatomic structures, and provide the best result.  The technique, its benefits, alternatives and risks were discussed with the patient.  The patient underwent the procedure as follows: The patient was positioned supine on the operating room table.  The area of the defect and the surrounding skin were anesthetized with buffered 1% lidocaine with epinephrine.  The area was washed with chlorhexidine.  Sterile drapes were applied. \\n\\nA decision was made to reconstruct the defect utilizing an interpolation axial flap and a staged reconstruction.  A telfa template was made of the defect.  This telfa template was then used to outline the Cheek-To-Nose Interpolation flap.  The flap was excised through the skin and subcutaneous tissue down to the layer of the underlying musculature.  The interpolation flap was carefully excised within this deep plane to maintain its blood supply.  The edges of the donor site were undermined.   The donor site was closed in a primary fashion.  The pedicle was then rotated into position and sutured.  The flap was carried over to close the defect.  Once the tube was sutured into place, adequate blood supply was confirmed with blanching and refill.  The pedicle was then wrapped with xeroform gauze and dressed appropriately with a telfa and gauze bandage to ensure continued blood supply and protect the attached pedicle.  The second stage of the flap (takedown) would occur after sufficient blood supply has been established, at about 3 weeks.
Estlander Flap (Lower To Upper Lip) Text: The defect of the lower lip was assessed and measured.  Given the location and size of the defect, an Estlander flap was deemed most appropriate.  Using a sterile surgical marker, an appropriate Estlander flap was measured and drawn on the upper lip. Local anesthesia was then infiltrated. A scalpel was then used to incise the lateral aspect of the flap, through skin, muscle and mucosa, leaving the flap pedicled medially.  The flap was then rotated and positioned to fill the lower lip defect.  The flap was then sutured into place with a three layer technique, closing the orbicularis oris muscle layer with subcutaneous buried sutures, followed by a mucosal layer and an epidermal layer.
Interpolation Flap Text: A decision was made to reconstruct the defect utilizing an interpolation axial flap and a staged reconstruction.  A telfa template was made of the defect.  This telfa template was then used to outline the interpolation flap.  The flap was excised through the skin and subcutaneous tissue down to the layer of the underlying musculature.  The interpolation flap was carefully excised within this deep plane to maintain its blood supply.  The edges of the donor site were undermined.   The donor site was closed in a primary fashion.  The pedicle was then rotated into position and sutured.  Once the tube was sutured into place, adequate blood supply was confirmed with blanching and refill.  The pedicle was then wrapped with xeroform gauze and dressed appropriately with a telfa and gauze bandage to ensure continued blood supply and protect the attached pedicle.
Melolabial Interpolation Flap Text: We discussed various closure modalities with the patient, including healing by second intention, primary closure, skin graft and various flaps.  The location and configuration of the defect indicated that a melolabial interpolation flap would result in the least disturbance of the position and function of the surrounding anatomic structures, and provide the best result.  The technique, its benefits, alternatives and risks were discussed with the patient.  The patient underwent the procedure as follows: The patient was positioned supine on the operating room table.  The area of the defect and the surrounding skin were anesthetized with buffered 1% lidocaine with epinephrine.  The area was washed with chlorhexidine.  Sterile drapes were applied. \\n\\n\\nA decision was made to reconstruct the defect utilizing an interpolation axial flap and a staged reconstruction.  A telfa template was made of the defect.  This telfa template was then used to outline the melolabial interpolation flap.  The donor and recipient areas for the pedicle flap was then injected with anesthesia.  The flap was excised through the skin and subcutaneous tissue down to the layer of the underlying musculature.  The pedicle flap was carefully excised within this deep plane to maintain its blood supply.  The edges of the donor site were undermined.   The donor site was closed in a primary fashion.  The pedicle was then rotated into position and sutured. The flap was carried over to close the defect. Once the tube was sutured into place, adequate blood supply was confirmed with blanching and refill.  The pedicle was then wrapped with xeroform gauze and dressed appropriately with a telfa and gauze bandage to ensure continued blood supply and protect the attached pedicle.  The second stage of the flap (takedown) would occur after sufficient blood supply has been established, at about 3 weeks.
Mastoid Interpolation Flap Text: A decision was made to reconstruct the defect utilizing an interpolation axial flap and a staged reconstruction.  A telfa template was made of the defect.  This telfa template was then used to outline the mastoid interpolation flap.  The donor area for the pedicle flap was then injected with anesthesia.  The flap was excised through the skin and subcutaneous tissue down to the layer of the underlying musculature.  The pedicle flap was carefully excised within this deep plane to maintain its blood supply.  The edges of the donor site were undermined.   The donor site was closed in a primary fashion.  The pedicle was then rotated into position and sutured. The flap was carried over to close the defect. Once the tube was sutured into place, adequate blood supply was confirmed with blanching and refill.  The pedicle was then wrapped with xeroform gauze and dressed appropriately with a telfa and gauze bandage to ensure continued blood supply and protect the attached pedicle.
Paramedian Forehead Flap Text: We discussed various closure modalities with the patient, including healing by second intention, primary closure, skin graft and various flaps.  The location and configuration of the defect indicated that a paramedian forehead (interpolation) flap would result in the least disturbance of the position and function of the surrounding anatomic structures, and provide the best result.  The technique, its benefits, alternatives and risks were discussed with the patient.  The patient underwent the procedure as follows: The patient was positioned supine on the operating room table.  The area of the defect and the surrounding skin were anesthetized with buffered 1% lidocaine with epinephrine.  The area was washed with chlorhexidine.  Sterile drapes were applied. \\n\\nA telfa template was made of the defect.  This telfa template was then used to outline the paramedian forehead pedicle flap.  The flap was excised through the skin and subcutaneous tissue down to the layer of the underlying musculature.  The pedicle flap was carefully excised within this deep plane to maintain its blood supply.  The edges of the donor site were undermined.   The donor site was closed in a primary fashion.  The pedicle was then rotated into position and sutured.  The flap was carried over to close the defect. \\n Once the tube was sutured into place, adequate blood supply was confirmed with blanching and refill.  The pedicle was then wrapped with xeroform gauze and dressed appropriately with a telfa and gauze bandage to ensure continued blood supply and protect the attached pedicle.  The second stage of the flap (takedown) would occur after sufficient blood supply has been established, at about 3 weeks.
Posterior Auricular Interpolation Flap Text: We discussed various closure modalities with the patient, including healing by second intention, primary closure, skin graft and various flaps.  The location and configuration of the defect indicated that a post-auricular interpolation flap would result in the least disturbance of the position and function of the surrounding anatomic structures, and provide the best result.  The technique, its benefits, alternatives and risks were discussed with the patient.  The patient underwent the procedure as follows: The patient was positioned supine on the operating room table.  The area of the defect and the surrounding skin were anesthetized with buffered 1% lidocaine with epinephrine.  The area was washed with chlorhexidine.  Sterile drapes were applied. \\n\\nA decision was made to reconstruct the defect utilizing an interpolation axial flap and a staged reconstruction.  A telfa template was made of the defect.  This telfa template was then used to outline the posterior auricular interpolation flap.  The donor area for the pedicle flap was then injected with anesthesia.  The flap was excised through the skin and subcutaneous tissue down to the layer of the underlying musculature.  The pedicle flap was carefully excised within this deep plane to maintain its blood supply.  The edges of the donor site were undermined.   The donor site was closed in a primary fashion.  The pedicle was then rotated into position and sutured.  The flap was carried over to close the defect. Once the tube was sutured into place, adequate blood supply was confirmed with blanching and refill.  The pedicle was then wrapped with xeroform gauze and dressed appropriately with a telfa and gauze bandage to ensure continued blood supply and protect the attached pedicle.  The second stage of the flap (takedown) would occur after sufficient blood supply has been established, at about 3 weeks.
Cheiloplasty (Complex) Text: A decision was made to reconstruct the defect with a  cheiloplasty.  The defect was undermined extensively.  Additional obicularis oris muscle was excised with a 15 blade scalpel.  The defect was converted into a full thickness wedge to facilite a better cosmetic result.  Small vessels were then tied off with 5-0 monocyrl. The obicularis oris, superficial fascia, adipose and dermis were then reapproximated.  After the deeper layers were approximated the epidermis was reapproximated with particular care given to realign the vermilion border.
Cheiloplasty (Less Than 50%) Text: A decision was made to reconstruct the defect with a  cheiloplasty.  The defect was undermined extensively.  Additional obicularis oris muscle was excised with a 15 blade scalpel.  The defect was converted into a full thickness wedge, of less than 50% of the vertical height of the lip, to facilite a better cosmetic result.  Small vessels were then tied off with 5-0 monocyrl. The obicularis oris, superficial fascia, adipose and dermis were then reapproximated.  After the deeper layers were approximated the epidermis was reapproximated with particular care given to realign the vermilion border.
Ear Wedge Repair Text: We discussed various closure modalities with the patient, including healing by second intention, primary closure, skin graft and various flaps.  The location and configuration of the defect indicated that a Ear Wedge Repair / Complex Layered Linear Closure would result in the least disturbance of the position and function of the surrounding anatomic structures, and provide the best result.  The technique, its benefits, alternatives and risks were discussed with the patient.  The patient underwent the procedure as follows: The patient was positioned supine on the operating room table.  The area of the defect and the surrounding skin were anesthetized with buffered 1% lidocaine with epinephrine.  The area was washed with chlorhexidine.  Sterile drapes were applied. \\n\\nA wedge excision was completed by carrying down an excision through the full thickness of the ear and cartilage with an inward facing Burow's triangle.  Retension/plication sutures were placed in the cartilage plane to reduce tension.  The wound was then closed in a layered fashion with subcutaneous buried interrupted sutures and epidermal layer with running sutures.
Full Thickness Lip Wedge Repair (Flap) Text: Given the location of the defect and the proximity to free margins a full thickness wedge repair was deemed most appropriate.  Using a sterile surgical marker, the appropriate repair was drawn incorporating the defect and placing the expected incisions perpendicular to the vermilion border.  The vermilion border was also meticulously outlined to ensure appropriate reapproximation during the repair.  The area thus outlined was incised through and through with a #15 scalpel blade.  The muscularis and dermis were reaproximated with deep sutures following hemostasis. Care was taken to realign the vermilion border before proceeding with the superficial closure.  Once the vermilion was realigned the superfical and mucosal closure was finished.
Burow's Graft Text: The defect edges were debeveled with a #15 scalpel blade.  Given the location of the defect, shape of the defect, the proximity to free margins and the presence of a standing cone deformity a Burow's full thickness skin graft was deemed most appropriate. The standing cone was removed and this tissue was then trimmed to the shape of the primary defect. The adipose tissue was also removed until only dermis and epidermis were left.  The skin margins of the secondary defect were undermined to an appropriate distance in all directions utilizing iris scissors.  The secondary defect was closed with interrupted buried subcutaneous sutures.  The skin edges were then re-apposed with running  sutures.  The full thickness skin graft was then placed in the primary defect and oriented appropriately.
Cartilage Graft Text: PLEASE NOTE THAT TWO SEPARATE AND DISTINCT GRAFTS WERE USED IN THE REPAIR OF THIS DEFECT ON THE ALA. 1. CARTILAGE GRAFT, AND 2. FULL THICKNESS SKIN GRAFT. CARTILAGE WAS NEEDED TO STABLIZE THE ALA AND PREVENT ALAR RETRACTION AND FUNCTIONAL AND COSMETIC MORBIDITY. A FULL THICKNESS SKIN GRAFT IS USED TO COVER THE ENTIRE DEFECT ONCE CARTILAGE GRAFT IS IN PLACE. PLEASE NOTE THIS IS NOT A COMPOSITE GRAFT.\\n\\nThe surgical defect and surrounding skin were prepped with Hibiclens. The choice of the repair was performed because damaged skin surrounding defect made closure difficult, because inelasticity of skin made closure difficult, because there is insufficient tissue mobility to close the wound with local tissue, to avoid a deforming, depressed, and contracted scar, to avoid anatomic distortion and/or functional deficit in adjacent fixed structures, to avoid disruption to anatomic adjacent free margins, to preserve normal anatomy, to protect underlying structures where insufficient local skin is available for primary direct repair, to reduce subcutaneous dead space to reduce risk of hematoma, to reduce tension to skin to allow closure, and to reduce tension to enhance both functional and cosmetic results. Due to the defect being at the alar rim, a cartilage graft is necessary to prevent alar notching and significant cosmetic and functional morbidity. Given the location of the defect, shape of the defect, and the lack of supportive tissue at the free margin, cartilage defect a cartilage graft was deemed most appropriate. An appropriate donor site was identified at the ear antihelix, cleansed, and anesthetized. The cartilage graft was then harvested and transferred to the recipient site, oriented appropriately and then sutured into place. The donor site was then repaired using a primary closure.
Composite Graft Text: The defect edges were debeveled with a #15 scalpel blade.  Given the location of the defect, shape of the defect, the proximity to free margins and the fact the defect was full thickness a composite graft was deemed most appropriate.  The defect was outline and then transferred to the donor site.  A full thickness graft was then excised from the donor site. The graft was then placed in the primary defect, oriented appropriately and then sutured into place.  The secondary defect was then repaired using a primary closure.
Epidermal Autograft Text: The defect edges were debeveled with a #15 scalpel blade.  Given the location of the defect, shape of the defect and the proximity to free margins an epidermal autograft was deemed most appropriate.  Using a sterile surgical marker, the primary defect shape was transferred to the donor site. The epidermal graft was then harvested.  The skin graft was then placed in the primary defect and oriented appropriately.
Dermal Autograft Text: The defect edges were debeveled with a #15 scalpel blade.  Given the location of the defect, shape of the defect and the proximity to free margins a dermal autograft was deemed most appropriate.  Using a sterile surgical marker, the primary defect shape was transferred to the donor site. The area thus outlined was incised deep to adipose tissue with a #15 scalpel blade.  The harvested graft was then trimmed of adipose and epidermal tissue until only dermis was left.  The skin graft was then placed in the primary defect and oriented appropriately.
Ftsg Text: We discussed various closure modalities with the patient, including healing by second intention, primary closure, skin graft and various flaps.  The location and configuration of the defect indicated that a Full Thickness Skin Graft would result in the least disturbance of the position and function of the surrounding anatomic structures, and provide the best result.  The technique, its benefits, alternatives and risks were discussed with the patient.  The patient underwent the procedure as follows: The patient was positioned supine on the operating room table.  The area of the defect and the surrounding skin were anesthetized with buffered 1% lidocaine with epinephrine.  The area was washed with chlorhexidine.  Sterile drapes were applied. \\n\\nThe defect edges were debeveled with a #15 scalpel blade.  Using a sterile surgical marker, the primary defect shape was transferred to the donor site. The area thus outlined was incised with a #15 scalpel blade.  The harvested graft was then trimmed of adipose tissue until only dermis and epidermis was left.  The skin margins of the secondary defect were undermined to an appropriate distance in all directions utilizing iris scissors.  The secondary defect was closed with an intermediate layered closure.  The skin graft was then placed and sewn in the primary defect and oriented appropriately with running fast absorbing sutures.
Pinch Graft Text: The defect edges were debeveled with a #15 scalpel blade. Given the location of the defect, shape of the defect and the proximity to free margins a pinch graft was deemed most appropriate. Using a sterile surgical marker, the primary defect shape was transferred to the donor site. The area thus outlined was incised deep to adipose tissue with a #15 scalpel blade.  The harvested graft was then trimmed of adipose tissue until only dermis and epidermis was left. The skin margins of the secondary defect were undermined to an appropriate distance in all directions utilizing iris scissors.  The secondary defect was closed with interrupted buried subcutaneous sutures.  The skin edges were then re-apposed with running  sutures.  The skin graft was then placed in the primary defect and oriented appropriately.
Skin Substitute Text: The rationale for the use of skin substitute graft was explained to the patient and consent was obtained. The risks, benefits and alternatives to therapy were discussed in detail. Specifically, the risks of infection, scarring, bleeding, prolonged wound healing were addressed. Prior to the procedure, the treatment site was clearly identified and confirmed by the patient. All components of Universal Protocol/PAUSE Rule completed.\\n\\nThe area was prepped with chlorhexidine. The choice of the repair was performed because damaged skin surrounding defect made closure difficult, because there is insufficient tissue mobility to close the wound with local tissue, because the wound is located in an area of active movement and a simple closure will increase the risk of dehiscence from repeated tension vectors in opposite directions, because the wound edges would not approximate with digital pressure due to a combination of the size of the wound and the tightness of the skin surrounding the wound making primary closure unattainable, to close large gap created by lesion removal, to preserve the functional anatomy, to preserve normal anatomy, to protect underlying structures where insufficient local skin is available for primary direct repair, and to reduce tension to enhance both functional and cosmetic results.\\n\\nSpecifically, on the lower leg, the choice of repair was performed because there is insufficient tissue mobility to close the wound with local tissue or reconstruction due to tightness of the leg, poor oxygenation, and presence of lower extremity edema. The use of skin substitute allograft was deemed medically necessary to support granulation tissue regeneration thereby enhancing the speed of healing, minimize infection and other wound-related complications.  Given the defect's location, size and depth of the defect and pt's history of poor wound healing, skin substitute graft application was deemed most appropriate. The wound surface area is complex and variable, creating a larger surface area than what can be reasonably calculated mathematically. This can warrant additional amount of allograft to ensure proper contact with the wound base, both at the deep and peripheral margins to ensure proper coverage with the tissue substitute and provide the patient with the most optimal healing. Based on this, the quantity and number of units were selected and utilized with great discretion by the Mohs surgeon. The graft material was selected to best fit the size/depth of the defect. The graft was then placed in the primary defect and oriented appropriately, reconstituted with saline and folded up on itself to ensure complete coverage of wound with zero wasted units.  The skin substitute graft was then covered with a waterproof bandage which will be left on until pt's follow up appointment.\\n\\nI reviewed with the patient in detail post-care instructions.  Should the patient develop any fevers, chills, bleeding, severe pain patient will contact the office immediately.
Split-Thickness Skin Graft Text: The defect edges were debeveled with a #15 scalpel blade.  Given the location of the defect, shape of the defect and the proximity to free margins a split thickness skin graft was deemed most appropriate.  Using a sterile surgical marker, the primary defect shape was transferred to the donor site. The split thickness graft was then harvested.  The skin graft was then placed in the primary defect and oriented appropriately.
Tissue Cultured Epidermal Autograft Text: The defect edges were debeveled with a #15 scalpel blade.  Given the location of the defect, shape of the defect and the proximity to free margins a tissue cultured epidermal autograft was deemed most appropriate.  The graft was then trimmed to fit the size of the defect.  The graft was then placed in the primary defect and oriented appropriately.
Xenograft Text: We discussed various closure modalities with the patient, including healing by second intention, primary closure, skin graft and various flaps, and felt that the location and configuration of the defect indicated that a Xenograft would result in the least disturbance of the position and function of the surrounding anatomic structures and provide the best result.  The technique, its benefits, alternatives and risks were discussed with the patient.  Appropriate instructions were given, informed consent was obtained, and then the patient underwent the procedure as follows: The patient was taken to the operative suite and placed supine on the operating room table.  The area of the defect and the surrounding skin was anesthetized with buffered 0.5% lidocaine with epinephrine.  The area was washed with chlorhexidine.  Sterile drapes were applied. \\n\\nThe wound edges of the Mohs surgery defect were debeveled and hemostasis was achieved using spot electrodesiccation as necessary.  The Xenograft was trimmed to accurately fit the primary Mohs surgical defect.  The graft was secured in position using 5-0 Fast Absorbing gut tacking sutures. The closure was manually tested and found to be sound for strength and hemostasis.
Complex Repair And Flap Additional Text (Will Appearing After The Standard Complex Repair Text): The complex repair was not sufficient to completely close the primary defect. The remaining additional defect was repaired with the flap mentioned below.
Complex Repair And Graft Additional Text (Will Appearing After The Standard Complex Repair Text): The complex repair was not sufficient to completely close the primary defect. The remaining additional defect was repaired with the graft mentioned below.
Eyelid Full Thickness Repair - 60050: The eyelid defect was full thickness which required a wedge repair of the eyelid. Special care was taken to ensure that the eyelid margin was realligned when placing sutures.
Eyelid Partial Thickness Repair - 70118: The eyelid defect was partial thickness which required a wedge repair of the eyelid. Special care was taken to ensure that the eyelid margin was realligned when placing sutures.
Intermediate Repair And Flap Additional Text (Will Appearing After The Standard Complex Repair Text): The intermediate repair was not sufficient to completely close the primary defect. The remaining additional defect was repaired with the flap mentioned below.
Intermediate Repair And Graft Additional Text (Will Appearing After The Standard Complex Repair Text): The intermediate repair was not sufficient to completely close the primary defect. The remaining additional defect was repaired with the graft mentioned below.
Localized Dermabrasion With 15 Blade Text: The patient was draped in routine manner.  Localized dermabrasion using a 15 blade was performed in routine manner to papillary dermis. This spot dermabrasion is being performed to complete skin cancer reconstruction. It also will eliminate the other sun damaged precancerous cells that are known to be part of the regional effect of a lifetime's worth of sun exposure. This localized dermabrasion is therapeutic and should not be considered cosmetic in any regard.
Localized Dermabrasion With Sand Papertext: The patient was draped in routine manner.  Localized dermabrasion using sterile sand paper was performed in routine manner to papillary dermis. This spot dermabrasion is being performed to complete skin cancer reconstruction. It also will eliminate the other sun damaged precancerous cells that are known to be part of the regional effect of a lifetime's worth of sun exposure. This localized dermabrasion is therapeutic and should not be considered cosmetic in any regard.
Localized Dermabrasion With Wire Brush Text: The patient was draped in routine manner.  Localized dermabrasion using successive sandpaper performed in routine manner to papillary dermis. This spot dermabrasion is being performed to revise the scar and blur its margins.
Purse String (Simple) Text: We discussed various closure modalities with the patient, including healing by second intention, primary closure, skin graft and various flaps, and felt that the location and configuration of the defect indicated that a purse-string closure would result in the least disturbance of the position and function of the surrounding anatomic structures and provide the best result.  The technique, its benefits, alternatives and risks were discussed with the patient.  Appropriate instructions were given, informed consent was obtained, and then the patient underwent the procedure as follows: The patient was taken to the operative suite and placed supine on the operating room table.  The area of the defect and the surrounding skin was anesthetized with buffered 0.5% lidocaine with epinephrine.  The area was washed with chlorhexidine.  Sterile drapes were applied. \\n\\nThe wound edges of the Mohs surgery defect were debeveled. \\n Undermining was performed circumferentially around the surgical defect.  Closure of the subcutaneous was achieved with a deep buried purse string suture, which was then placed and tightened in the subcutaneous tissue.  A superficial layer of horizontal mattress suture was then placed through the epidermis in a purse-string fashion.
Tarsorrhaphy Text: A tarsorrhaphy was performed using Frost sutures.
Manual Repair Warning Statement: We plan on removing the manually selected variable below in favor of our much easier automatic structured text blocks found in the previous tab. We decided to do this to help make the flow better and give you the full power of structured data. Manual selection is never going to be ideal in our platform and I would encourage you to avoid using manual selection from this point on, especially since I will be sunsetting this feature. It is important that you do one of two things with the customized text below. First, you can save all of the text in a word file so you can have it for future reference. Second, transfer the text to the appropriate area in the Library tab. Lastly, if there is a flap or graft type which we do not have you need to let us know right away so I can add it in before the variable is hidden. No need to panic, we plan to give you roughly 6 months to make the change.
Same Histology In Subsequent Stages Text: The pattern and morphology of the tumor is as described in the first stage.
No Residual Tumor Seen Histology Text: There were no malignant cells seen in the sections examined.  No perineural invasion was seen.  All margins examined were clear.
Inflammation Suggestive Of Cancer Camouflage Histology Text: There was a dense lymphocytic infiltrate which prevented adequate histologic evaluation of adjacent structures.  Residual cancer cells may be masked by the dense inflammation.
Follicular Atypia Histology Text: There is cellular atypia present with extension down to the hair follicles.
Incidental Superficial Basal Cell Carcinoma Histology Text: Incidentally, a superficial basal cell carcinoma demonstrating hyperchromatic nuclei and peripheral palasading is seen within the epidermis.  Tumor Pattern: Small nests attached to the undersurface of the epidermis
Incidental Squamous Cell Carcinoma In Situ Histology Text: Incidentally, a squamous cell carcinoma in situ is present demonstrating full thickness atypia within the epidermis.
Bcc Histology Text: There were nests and cords of atypical hyperchromatic basaloid cells present with peripheral palisading and retraction within fibromyxoid stroma.  The area of positive cancer is as marked on the Mohs map.
Bcc Infiltrative Histology Text: There were infiltrating nests and cords of basaloid cells demonstrating an infiltrative pattern into the dermis.  The area of positive cancer is as marked on the Mohs map.
Bcc Micronodular Histology Text: There were small nests and cords of atypical hyperchromatic basaloid cells present with peripheral palisading and retraction within fibromyxoid stroma.  The area of positive cancer is as marked on the Mohs map.
Bcc  Morpheaform/Sclerosing Histology Text: There were nests and cords of basaloid cells demonstrating an morpheaform/sclerosing/infiltrative pattern.  The area of positive cancer is as marked on the Mohs map.
Bcc  Nodular Histology Text: There were nests and cords of atypical hyperchromatic basaloid cells present with peripheral palisading and retraction within fibromyxoid stroma; there is a nodular pattern.  The area of positive cancer is as marked on the Mohs map.
Bcc Pigmented Histology Text: There were nests and cords of atypical hyperchromatic pigmented basaloid cells present with peripheral palisading and retraction within fibromyxoid stroma.  The area of positive cancer is as marked on the Mohs map.
Bcc Superficial Histology Text: Superficial basaloid nests were seen at the basal layer of the epidermis demonstrating peripheral palisating and/or clefting.  The area of positive cancer is as marked on the Mohs map.
Bcc Superficial Pigmented Histology Text: There were superficial nests of hyperchromatic basaloid cells present with peripheral palisading and retraction within fibromyxoid stroma.  The area of positive cancer is as marked on the Mohs map.
Scc Histology Text: There were aggregates and nests of atypical, pleomorphic squamous cells.  The area of positive cancer is as marked on the Mohs map. The area of positive cancer is as marked on the Mohs map.
Scc Well Differentiated Histology Text: There were nests of invasive well-differentiated atypical keratinocytes seen in the dermis.
Scc Moderately Differentiated Histology Text: There were nests of invasive moderately-differentiated atypical keratinocytes seen.   The area of positive cancer is as marked on the Mohs map.
Scc Poorly Differentiated Histology Text: There were nests and single cells of invasive pooly differentiated atypical keratinocytes seen.   The area of positive cancer is as marked on the Mohs map.
Scc In Situ Histology Text: Full thickness epidermal atypia was seen composed of atypical keratinocytes.   The area of positive cancer is as marked on the Mohs map.
Scc In Situ With Follicular Extension Histology Text: Full thickness epidermal atypia was seen composed of atypical keratinocytes; some invading into the hair follicle, demonstrating follicular extension.   The area of positive cancer is as marked on the Mohs map.
Mart-1 - Positive Histology Text: MART-1 staining demonstrates areas of higher density and clustering of melanocytes with Pagetoid spread upwards within the epidermis. The surgical margins are positive for tumor cells.
Mart-1 - Negative Histology Text: MART-1 staining demonstrates a normal density and pattern of melanocytes along the dermal-epidermal junction. The surgical margins are negative for tumor cells.
Information: Selecting Yes will display possible errors in your note based on the variables you have selected. This validation is only offered as a suggestion for you. PLEASE NOTE THAT THE VALIDATION TEXT WILL BE REMOVED WHEN YOU FINALIZE YOUR NOTE. IF YOU WANT TO FAX A PRELIMINARY NOTE YOU WILL NEED TO TOGGLE THIS TO 'NO' IF YOU DO NOT WANT IT IN YOUR FAXED NOTE.
Bill 59 Modifier?: No - Continue to Bill 79 Modifier

## 2024-09-12 NOTE — PROCEDURE: TREATMENT REGIMEN
Otc Regimen: Vaseline or Aquaphor to wound
Plan: Amie as needed q 4 wks until fully healed.
Continue Regimen: Continue vaseline and wound care with bandage and cover.  Continue Compression stockings as tolerated
Detail Level: Zone

## 2024-09-18 ENCOUNTER — APPOINTMENT (RX ONLY)
Dept: URBAN - METROPOLITAN AREA CLINIC 45 | Facility: CLINIC | Age: 89
Setting detail: DERMATOLOGY
End: 2024-09-18

## 2024-09-18 DIAGNOSIS — Z48.1 ENCOUNTER FOR PLANNED POSTPROCEDURAL WOUND CLOSURE: ICD-10-CM | Status: INADEQUATELY CONTROLLED

## 2024-09-18 DIAGNOSIS — Z48.817 ENCOUNTER FOR SURGICAL AFTERCARE FOLLOWING SURGERY ON THE SKIN AND SUBCUTANEOUS TISSUE: ICD-10-CM

## 2024-09-18 PROCEDURE — 99024 POSTOP FOLLOW-UP VISIT: CPT

## 2024-09-18 PROCEDURE — ? REPAIR NOTE

## 2024-09-18 PROCEDURE — 15271 SKIN SUB GRAFT TRNK/ARM/LEG: CPT

## 2024-09-18 PROCEDURE — ? POST-OP WOUND CHECK

## 2024-09-18 ASSESSMENT — LOCATION DETAILED DESCRIPTION DERM
LOCATION DETAILED: RIGHT DISTAL PRETIBIAL REGION
LOCATION DETAILED: RIGHT KNEE

## 2024-09-18 ASSESSMENT — LOCATION ZONE DERM: LOCATION ZONE: LEG

## 2024-09-18 ASSESSMENT — LOCATION SIMPLE DESCRIPTION DERM
LOCATION SIMPLE: RIGHT PRETIBIAL REGION
LOCATION SIMPLE: RIGHT KNEE

## 2024-09-18 ASSESSMENT — PAIN INTENSITY VAS: HOW INTENSE IS YOUR PAIN 0 BEING NO PAIN, 10 BEING THE MOST SEVERE PAIN POSSIBLE?: 1/10 PAIN

## 2024-09-18 NOTE — PROCEDURE: POST-OP WOUND CHECK
Detail Level: Simple
Wound Dressing Override (Optional): Aquaphor and/or dressing placed as appropriate
Add 20000 Cpt? (Important Note: In 2017 The Use Of 36380 Is Being Tracked By Cms To Determine Future Global Period Reimbursement For Global Periods): yes

## 2024-09-26 ENCOUNTER — APPOINTMENT (RX ONLY)
Dept: URBAN - METROPOLITAN AREA CLINIC 45 | Facility: CLINIC | Age: 89
Setting detail: DERMATOLOGY
End: 2024-09-26

## 2024-09-26 DIAGNOSIS — S31000A OPEN WOUND(S) (MULTIPLE) OF UNSPECIFIED SITE(S), WITHOUT MENTION OF COMPLICATION: ICD-10-CM | Status: INADEQUATELY CONTROLLED

## 2024-09-26 PROBLEM — S81.001D: Status: ACTIVE | Noted: 2024-09-26

## 2024-09-26 PROCEDURE — ? TREATMENT REGIMEN

## 2024-09-26 PROCEDURE — ? POST-OP WOUND CHECK (NO GLOBAL PERIOD)

## 2024-09-26 PROCEDURE — 99213 OFFICE O/P EST LOW 20 MIN: CPT

## 2024-09-26 ASSESSMENT — LOCATION SIMPLE DESCRIPTION DERM: LOCATION SIMPLE: RIGHT KNEE

## 2024-09-26 ASSESSMENT — LOCATION DETAILED DESCRIPTION DERM: LOCATION DETAILED: RIGHT KNEE

## 2024-09-26 ASSESSMENT — LOCATION ZONE DERM: LOCATION ZONE: LEG

## 2024-09-26 NOTE — PROCEDURE: TREATMENT REGIMEN
Continue Regimen: Continue vaseline and wound care with bandage and cover.  Continue Compression stockings as tolerated
Plan: Amie as needed q 4 wks until fully healed.
Detail Level: Zone
Otc Regimen: Vaseline or Aquaphor to wound

## 2024-10-10 ENCOUNTER — APPOINTMENT (RX ONLY)
Dept: URBAN - METROPOLITAN AREA CLINIC 45 | Facility: CLINIC | Age: 89
Setting detail: DERMATOLOGY
End: 2024-10-10

## 2024-10-10 DIAGNOSIS — S31000A OPEN WOUND(S) (MULTIPLE) OF UNSPECIFIED SITE(S), WITHOUT MENTION OF COMPLICATION: ICD-10-CM | Status: INADEQUATELY CONTROLLED

## 2024-10-10 PROBLEM — S81.801D UNSPECIFIED OPEN WOUND, RIGHT LOWER LEG, SUBSEQUENT ENCOUNTER: Status: ACTIVE | Noted: 2024-10-10

## 2024-10-10 PROCEDURE — ? TREATMENT REGIMEN

## 2024-10-10 PROCEDURE — 99213 OFFICE O/P EST LOW 20 MIN: CPT

## 2024-10-10 PROCEDURE — ? POST-OP WOUND CHECK (NO GLOBAL PERIOD)

## 2024-10-10 ASSESSMENT — LOCATION ZONE DERM: LOCATION ZONE: LEG

## 2024-10-10 ASSESSMENT — LOCATION DETAILED DESCRIPTION DERM
LOCATION DETAILED: RIGHT MEDIAL DISTAL PRETIBIAL REGION
LOCATION DETAILED: RIGHT PROXIMAL PRETIBIAL REGION

## 2024-10-10 ASSESSMENT — LOCATION SIMPLE DESCRIPTION DERM: LOCATION SIMPLE: RIGHT PRETIBIAL REGION

## 2025-04-02 ENCOUNTER — APPOINTMENT (OUTPATIENT)
Dept: URBAN - METROPOLITAN AREA CLINIC 44 | Facility: CLINIC | Age: OVER 89
Setting detail: DERMATOLOGY
End: 2025-04-02

## 2025-04-02 DIAGNOSIS — L82.1 OTHER SEBORRHEIC KERATOSIS: ICD-10-CM

## 2025-04-02 DIAGNOSIS — Z85.828 PERSONAL HISTORY OF OTHER MALIGNANT NEOPLASM OF SKIN: ICD-10-CM

## 2025-04-02 DIAGNOSIS — Z12.83 ENCOUNTER FOR SCREENING FOR MALIGNANT NEOPLASM OF SKIN: ICD-10-CM

## 2025-04-02 DIAGNOSIS — L81.4 OTHER MELANIN HYPERPIGMENTATION: ICD-10-CM

## 2025-04-02 DIAGNOSIS — L57.0 ACTINIC KERATOSIS: ICD-10-CM

## 2025-04-02 DIAGNOSIS — L57.8 OTHER SKIN CHANGES DUE TO CHRONIC EXPOSURE TO NONIONIZING RADIATION: ICD-10-CM

## 2025-04-02 DIAGNOSIS — D18.0 HEMANGIOMA: ICD-10-CM

## 2025-04-02 DIAGNOSIS — D22 MELANOCYTIC NEVI: ICD-10-CM

## 2025-04-02 DIAGNOSIS — L82.0 INFLAMED SEBORRHEIC KERATOSIS: ICD-10-CM

## 2025-04-02 PROBLEM — D18.01 HEMANGIOMA OF SKIN AND SUBCUTANEOUS TISSUE: Status: ACTIVE | Noted: 2025-04-02

## 2025-04-02 PROBLEM — D22.5 MELANOCYTIC NEVI OF TRUNK: Status: ACTIVE | Noted: 2025-04-02

## 2025-04-02 PROCEDURE — 99213 OFFICE O/P EST LOW 20 MIN: CPT | Mod: 25

## 2025-04-02 PROCEDURE — ? COUNSELING

## 2025-04-02 PROCEDURE — 17110 DESTRUCTION B9 LES UP TO 14: CPT

## 2025-04-02 PROCEDURE — ? TREATMENT REGIMEN

## 2025-04-02 PROCEDURE — ? SUNSCREEN RECOMMENDATIONS

## 2025-04-02 PROCEDURE — ? LIQUID NITROGEN

## 2025-04-02 PROCEDURE — ? FULL BODY SKIN EXAM

## 2025-04-02 PROCEDURE — 17000 DESTRUCT PREMALG LESION: CPT | Mod: 59

## 2025-04-02 PROCEDURE — 17003 DESTRUCT PREMALG LES 2-14: CPT | Mod: 59

## 2025-04-02 ASSESSMENT — LOCATION ZONE DERM
LOCATION ZONE: HAND
LOCATION ZONE: FACE
LOCATION ZONE: NECK
LOCATION ZONE: NECK
LOCATION ZONE: TRUNK
LOCATION ZONE: LEG
LOCATION ZONE: FACE

## 2025-04-02 ASSESSMENT — LOCATION SIMPLE DESCRIPTION DERM
LOCATION SIMPLE: LEFT CHEEK
LOCATION SIMPLE: LEFT CALF
LOCATION SIMPLE: UPPER BACK
LOCATION SIMPLE: LEFT ANTERIOR NECK
LOCATION SIMPLE: RIGHT CHEEK
LOCATION SIMPLE: LEFT ANTERIOR NECK
LOCATION SIMPLE: ABDOMEN
LOCATION SIMPLE: RIGHT ANTERIOR NECK
LOCATION SIMPLE: LEFT HAND

## 2025-04-02 ASSESSMENT — LOCATION DETAILED DESCRIPTION DERM
LOCATION DETAILED: LEFT PROXIMAL CALF
LOCATION DETAILED: RIGHT LATERAL ABDOMEN
LOCATION DETAILED: LEFT RIB CAGE
LOCATION DETAILED: INFERIOR THORACIC SPINE
LOCATION DETAILED: RIGHT RIB CAGE
LOCATION DETAILED: LEFT INFERIOR LATERAL NECK
LOCATION DETAILED: RIGHT INFERIOR LATERAL NECK
LOCATION DETAILED: LEFT INFERIOR ANTERIOR NECK
LOCATION DETAILED: RIGHT CENTRAL BUCCAL CHEEK
LOCATION DETAILED: PERIUMBILICAL SKIN
LOCATION DETAILED: LEFT INFERIOR ANTERIOR NECK
LOCATION DETAILED: LEFT ULNAR DORSAL HAND
LOCATION DETAILED: LEFT INFERIOR MEDIAL MALAR CHEEK

## 2025-04-02 NOTE — HPI: EVALUATION OF SKIN LESION(S)
What Type Of Note Output Would You Prefer (Optional)?: Bullet Format
Hpi Title: Evaluation of Skin Lesions
How Severe Are Your Spot(S)?: mild
Have Your Spot(S) Been Treated In The Past?: has been treated
Additional History: Established patient \\nPresents in office for FBE\\nLast FBE May 2024 \\nSCC excised 9/2024\\nJDL patient